# Patient Record
Sex: MALE | Race: BLACK OR AFRICAN AMERICAN | Employment: FULL TIME | URBAN - METROPOLITAN AREA
[De-identification: names, ages, dates, MRNs, and addresses within clinical notes are randomized per-mention and may not be internally consistent; named-entity substitution may affect disease eponyms.]

---

## 2017-08-27 ENCOUNTER — HOSPITAL ENCOUNTER (EMERGENCY)
Facility: HOSPITAL | Age: 32
Discharge: HOME | End: 2017-08-27
Attending: EMERGENCY MEDICINE
Payer: OTHER MISCELLANEOUS

## 2017-08-27 VITALS
HEIGHT: 76 IN | HEART RATE: 83 BPM | SYSTOLIC BLOOD PRESSURE: 124 MMHG | OXYGEN SATURATION: 99 % | TEMPERATURE: 98 F | DIASTOLIC BLOOD PRESSURE: 72 MMHG | BODY MASS INDEX: 38.36 KG/M2 | WEIGHT: 315 LBS | RESPIRATION RATE: 17 BRPM

## 2017-08-27 DIAGNOSIS — S61.210A LACERATION OF RIGHT INDEX FINGER WITHOUT FOREIGN BODY WITHOUT DAMAGE TO NAIL, INITIAL ENCOUNTER: Primary | ICD-10-CM

## 2017-08-27 PROCEDURE — 99283 EMERGENCY DEPT VISIT LOW MDM: CPT

## 2017-08-27 PROCEDURE — 12001 RPR S/N/AX/GEN/TRNK 2.5CM/<: CPT

## 2017-08-27 PROCEDURE — 90471 IMMUNIZATION ADMIN: CPT

## 2017-08-27 NOTE — ED PROVIDER NOTES
Patient Seen in: [facility] Emergency Department    History   Patient presents with:  Laceration Abrasion (integumentary)    Stated Complaint: Finger laceration    HPI    40-year-old male injured his right index finger on a sharp piece of metal at display    ============================================================  ED Course  ------------------------------------------------------------  MDM     Laceration repair:    Verbal consent was obtained from the patient.   The 2 cm laceration located right

## 2017-08-31 ENCOUNTER — HOSPITAL ENCOUNTER (EMERGENCY)
Facility: HOSPITAL | Age: 32
Discharge: HOME | End: 2017-08-31
Attending: PHYSICIAN ASSISTANT
Payer: OTHER MISCELLANEOUS

## 2017-08-31 VITALS
WEIGHT: 315 LBS | HEIGHT: 76 IN | HEART RATE: 80 BPM | OXYGEN SATURATION: 97 % | BODY MASS INDEX: 38.36 KG/M2 | TEMPERATURE: 97 F | RESPIRATION RATE: 18 BRPM | SYSTOLIC BLOOD PRESSURE: 137 MMHG | DIASTOLIC BLOOD PRESSURE: 78 MMHG

## 2017-08-31 DIAGNOSIS — S61.219D FINGER LACERATION, SUBSEQUENT ENCOUNTER: Primary | ICD-10-CM

## 2017-08-31 DIAGNOSIS — Z51.89 ENCOUNTER FOR POST-TRAUMATIC WOUND CHECK: ICD-10-CM

## 2017-08-31 PROCEDURE — 99281 EMR DPT VST MAYX REQ PHY/QHP: CPT

## 2017-08-31 NOTE — ED INITIAL ASSESSMENT (HPI)
Pt here for wound check to right index finger suture put in Sunday here and now noticed pus like drainage today

## 2017-09-01 NOTE — ED PROVIDER NOTES
Patient Seen in: [facility] AND [facility] Emergency Department    History   Patient presents with:  Laceration Abrasion (integumentary)    Stated Complaint: right index finger injury     HPI    HPI:     28year old male who presents who presents to the emergenc well-developed and well-nourished. No acute distress. Head: Normocephalic/atraumatic. Neck: The neck is supple. There is no evidence of JVD. No meningeal signs. Chest: There is no tenderness to the chest wall.   Respiratory: Respiratory effort was nor [phone]  Schedule an appointment as soon as possible for a visit in 2 days  For follow-up, For wound re-check    Orval Six, MD Jose Alejandro Matthews [address] [phone]    Schedule an appointment as so

## 2017-09-01 NOTE — ED NOTES
Pt dcd to home aox4, ambulatory, discharge instruction given and voices understanding, denies any concern

## 2018-01-26 ENCOUNTER — HOSPITAL ENCOUNTER (EMERGENCY)
Facility: HOSPITAL | Age: 33
Discharge: HOME | End: 2018-01-26
Payer: MEDICAID

## 2018-01-26 ENCOUNTER — APPOINTMENT (OUTPATIENT)
Dept: GENERAL RADIOLOGY | Facility: HOSPITAL | Age: 33
End: 2018-01-26
Payer: MEDICAID

## 2018-01-26 VITALS
HEIGHT: 76 IN | WEIGHT: 315 LBS | TEMPERATURE: 98 F | RESPIRATION RATE: 16 BRPM | DIASTOLIC BLOOD PRESSURE: 93 MMHG | OXYGEN SATURATION: 97 % | SYSTOLIC BLOOD PRESSURE: 153 MMHG | HEART RATE: 86 BPM | BODY MASS INDEX: 38.36 KG/M2

## 2018-01-26 DIAGNOSIS — R09.1 PLEURISY: ICD-10-CM

## 2018-01-26 DIAGNOSIS — J06.9 UPPER RESPIRATORY TRACT INFECTION, UNSPECIFIED TYPE: Primary | ICD-10-CM

## 2018-01-26 PROCEDURE — 71046 X-RAY EXAM CHEST 2 VIEWS: CPT

## 2018-01-26 PROCEDURE — 99283 EMERGENCY DEPT VISIT LOW MDM: CPT

## 2018-01-26 RX ORDER — PREDNISONE 20 MG/1
60 TABLET ORAL DAILY
Qty: 12 TABLET | Refills: 0 | Status: SHIPPED | OUTPATIENT
Start: 2018-01-26 | End: 2018-01-30

## 2018-01-26 RX ORDER — ACETAMINOPHEN AND CODEINE PHOSPHATE 300; 30 MG/1; MG/1
1-2 TABLET ORAL EVERY 6 HOURS PRN
Qty: 20 TABLET | Refills: 0 | Status: SHIPPED | OUTPATIENT
Start: 2018-01-26 | End: 2018-02-02

## 2018-01-26 NOTE — ED PROVIDER NOTES
Patient Seen in: [facility] AND [facility] Emergency Department    History   CC: cough  HPI: Yassine Corderoamber 35year old male  who presents to the ER c/o cough, runny nose, congestion, fever for the past 2 weeks.   States in the last couple of days however he has b 41.39 kg/m²         General - Appears well, non-toxic and in NAD  Head - Appears symmetrical without deformity/swelling cranium, scalp, or facial bones  Eyes - PERRL, sclera not injected, no discharge noted, no periorbital edema  ENT - EAC bilaterally with soon as possible for a visit in 2 days        Medications Prescribed:  Current Discharge Medication List    START taking these medications    Acetaminophen-Codeine #3 300-30 MG Oral Tab  Take 1-2 tablets by mouth every 6 (six) hours as needed for Pain.   Qt

## 2020-08-22 ENCOUNTER — HOSPITAL ENCOUNTER (EMERGENCY)
Facility: HOSPITAL | Age: 35
Discharge: HOME | End: 2020-08-22
Payer: COMMERCIAL

## 2020-08-22 ENCOUNTER — APPOINTMENT (OUTPATIENT)
Dept: GENERAL RADIOLOGY | Facility: HOSPITAL | Age: 35
End: 2020-08-22
Attending: NURSE PRACTITIONER
Payer: COMMERCIAL

## 2020-08-22 VITALS
SYSTOLIC BLOOD PRESSURE: 168 MMHG | RESPIRATION RATE: 20 BRPM | HEART RATE: 75 BPM | BODY MASS INDEX: 55 KG/M2 | OXYGEN SATURATION: 99 % | WEIGHT: 315 LBS | TEMPERATURE: 98 F | DIASTOLIC BLOOD PRESSURE: 90 MMHG

## 2020-08-22 DIAGNOSIS — S39.012A STRAIN OF LUMBAR REGION, INITIAL ENCOUNTER: Primary | ICD-10-CM

## 2020-08-22 LAB
BACTERIA UR QL AUTO: NEGATIVE /HPF
BILIRUB UR QL: NEGATIVE
CLARITY UR: CLEAR
COLOR UR: YELLOW
GLUCOSE UR-MCNC: NEGATIVE MG/DL
HGB UR QL STRIP.AUTO: NEGATIVE
KETONES UR-MCNC: NEGATIVE MG/DL
LEUKOCYTE ESTERASE UR QL STRIP.AUTO: NEGATIVE
NITRITE UR QL STRIP.AUTO: NEGATIVE
PH UR: 6 [PH] (ref 5–8)
PROT UR-MCNC: NEGATIVE MG/DL
RBC #/AREA URNS AUTO: 0 /HPF
SP GR UR STRIP: 1.02 (ref 1–1.03)
UROBILINOGEN UR STRIP-ACNC: <2
WBC #/AREA URNS AUTO: 5 /HPF

## 2020-08-22 PROCEDURE — 72100 X-RAY EXAM L-S SPINE 2/3 VWS: CPT | Performed by: NURSE PRACTITIONER

## 2020-08-22 PROCEDURE — 96372 THER/PROPH/DIAG INJ SC/IM: CPT

## 2020-08-22 PROCEDURE — 81001 URINALYSIS AUTO W/SCOPE: CPT | Performed by: NURSE PRACTITIONER

## 2020-08-22 PROCEDURE — 99284 EMERGENCY DEPT VISIT MOD MDM: CPT

## 2020-08-22 RX ORDER — KETOROLAC TROMETHAMINE 30 MG/ML
30 INJECTION, SOLUTION INTRAMUSCULAR; INTRAVENOUS ONCE
Status: COMPLETED | OUTPATIENT
Start: 2020-08-22 | End: 2020-08-22

## 2020-08-22 RX ORDER — CYCLOBENZAPRINE HCL 10 MG
10 TABLET ORAL 3 TIMES DAILY PRN
Qty: 20 TABLET | Refills: 0 | Status: SHIPPED | OUTPATIENT
Start: 2020-08-22 | End: 2020-08-29

## 2020-08-22 NOTE — ED NOTES
Patient safe to DC home per MD. Leong Peer to dress self. DC teaching done, instructions reviewed with patient including when and how to follow up with healthcare providers and when to seek emergency care. The patient verbalizes understanding.   Patient ambulator

## 2020-08-22 NOTE — ED PROVIDER NOTES
Patient Seen in: [facility] AND [facility] Emergency Department      History   Patient presents with:  Back Pain    Stated Complaint: lower back pain    34yo/m with hx of obesity reports to the ED with R lower back pain.  Patient was seen last night at [facility] i Pulmonary:      Effort: Pulmonary effort is normal.      Breath sounds: Normal breath sounds. Abdominal:      General: Bowel sounds are normal.      Palpations: Abdomen is soft. Musculoskeletal: Normal range of motion.          General: Tenderness pre =====  CONCLUSION:      Mild degenerative disc and facet disease throughout the lumbar spine, most prominent at L5-S1.         Dictated by (CST): Juan Huffman MD on 8/22/2020 at 6:36 PM       Finalized by (CST): Juan Huffman MD on 8/22/2020 at 6:38 PM

## 2020-08-22 NOTE — ED INITIAL ASSESSMENT (HPI)
PT states seen previously by MD about work injury to his back from bending and lifting. Presents today with concerns of continued uncontrolled back pain.  Taking naproxen without enough relief

## 2021-02-06 ENCOUNTER — APPOINTMENT (OUTPATIENT)
Dept: GENERAL RADIOLOGY | Facility: HOSPITAL | Age: 36
End: 2021-02-06
Attending: EMERGENCY MEDICINE
Payer: OTHER MISCELLANEOUS

## 2021-02-06 ENCOUNTER — HOSPITAL ENCOUNTER (EMERGENCY)
Facility: HOSPITAL | Age: 36
Discharge: HOME | End: 2021-02-06
Attending: EMERGENCY MEDICINE
Payer: OTHER MISCELLANEOUS

## 2021-02-06 VITALS
RESPIRATION RATE: 18 BRPM | HEART RATE: 84 BPM | DIASTOLIC BLOOD PRESSURE: 101 MMHG | SYSTOLIC BLOOD PRESSURE: 152 MMHG | OXYGEN SATURATION: 98 % | TEMPERATURE: 99 F

## 2021-02-06 DIAGNOSIS — M25.521 RIGHT ELBOW PAIN: Primary | ICD-10-CM

## 2021-02-06 PROCEDURE — 99283 EMERGENCY DEPT VISIT LOW MDM: CPT

## 2021-02-06 PROCEDURE — 73080 X-RAY EXAM OF ELBOW: CPT | Performed by: EMERGENCY MEDICINE

## 2021-02-06 NOTE — ED PROVIDER NOTES
Patient Seen in: [facility] AND [facility] Emergency Department      History   Patient presents with:  Elbow Pain    Stated Complaint: R elbow pain     HPI/Subjective:   HPI    51-year-old male with no significant past medical history here after work injury [address] [facility] Device 02/06/21 1156 None (Room air)       Current:BP (!) 152/101   Pulse 84   Temp 98.5 °F (36.9 °C) (Oral)   Resp 18   SpO2 98%         Physical Exam  Vitals signs and nursing note reviewed. HENT:      Head: Normocephalic.       Mouth/Throat:      Mouth and imaging and found XR without acute fracture  - pain meds offered, pt declining          The patient was informed of their elevated blood pressure reading in the Emergency Department.   They were informed of the dangers of undiagnosed and untreated hyper

## 2021-02-06 NOTE — ED INITIAL ASSESSMENT (HPI)
Pt reports R elbow pain since fall on 2/2/2021, pt reports \" It feels like something is moving inside my elbow\"

## 2021-06-12 ENCOUNTER — APPOINTMENT (OUTPATIENT)
Dept: GENERAL RADIOLOGY | Facility: HOSPITAL | Age: 36
End: 2021-06-12
Payer: COMMERCIAL

## 2021-06-12 ENCOUNTER — HOSPITAL ENCOUNTER (EMERGENCY)
Facility: HOSPITAL | Age: 36
Discharge: HOME | End: 2021-06-12
Payer: COMMERCIAL

## 2021-06-12 VITALS
OXYGEN SATURATION: 97 % | BODY MASS INDEX: 38.36 KG/M2 | RESPIRATION RATE: 20 BRPM | HEIGHT: 76 IN | WEIGHT: 315 LBS | DIASTOLIC BLOOD PRESSURE: 113 MMHG | HEART RATE: 85 BPM | TEMPERATURE: 98 F | SYSTOLIC BLOOD PRESSURE: 168 MMHG

## 2021-06-12 DIAGNOSIS — I10 HYPERTENSION, UNSPECIFIED TYPE: ICD-10-CM

## 2021-06-12 DIAGNOSIS — J06.9 VIRAL URI WITH COUGH: Primary | ICD-10-CM

## 2021-06-12 PROCEDURE — 71045 X-RAY EXAM CHEST 1 VIEW: CPT

## 2021-06-12 PROCEDURE — 99283 EMERGENCY DEPT VISIT LOW MDM: CPT

## 2021-06-12 RX ORDER — IBUPROFEN 600 MG/1
600 TABLET ORAL EVERY 8 HOURS PRN
Qty: 30 TABLET | Refills: 0 | Status: SHIPPED | OUTPATIENT
Start: 2021-06-12 | End: 2021-06-19

## 2021-06-12 RX ORDER — GUAIFENESIN DEXTROMETHORPHAN HYDROBROMIDE ORAL SOLUTION 10; 100 MG/5ML; MG/5ML
5 SOLUTION ORAL EVERY 12 HOURS
Qty: 70 ML | Refills: 0 | Status: SHIPPED | OUTPATIENT
Start: 2021-06-12 | End: 2021-06-19

## 2021-06-13 NOTE — ED PROVIDER NOTES
Patient Seen in: [facility] AND [facility] Emergency Department      History   Patient presents with:  Cough/URI    Stated Complaint: runny nose,sore throat, cough    HPI/Subjective:   35yo/m with no chronic medical problems reports with cough, congestion, runny Abdominal:      General: Bowel sounds are normal.      Palpations: Abdomen is soft. Musculoskeletal:         General: No tenderness or deformity. Normal range of motion. Cervical back: Normal range of motion and neck supple.    Skin:     General: S [address]  [phone]    In 2 days            Medications Prescribed:  Current Discharge Medication List    START taking these medications    ibuprofen 600 MG Oral Tab  Take 1 tablet (600 mg total) by mouth every 8 (eight) hours as needed for Pain or Fever.

## 2021-06-13 NOTE — ED QUICK NOTES
Patient cleared for discharge by np. Belongings with patient. Patient discharge instructions reviewed with patient including when and how to follow up  with healthcare provider and when to seek medical treatment.

## 2021-07-11 ENCOUNTER — APPOINTMENT (OUTPATIENT)
Dept: GENERAL RADIOLOGY | Facility: HOSPITAL | Age: 36
End: 2021-07-11
Attending: EMERGENCY MEDICINE
Payer: COMMERCIAL

## 2021-07-11 ENCOUNTER — HOSPITAL ENCOUNTER (EMERGENCY)
Facility: HOSPITAL | Age: 36
Discharge: HOME | End: 2021-07-11
Attending: EMERGENCY MEDICINE
Payer: COMMERCIAL

## 2021-07-11 VITALS
OXYGEN SATURATION: 98 % | WEIGHT: 315 LBS | RESPIRATION RATE: 19 BRPM | DIASTOLIC BLOOD PRESSURE: 80 MMHG | HEART RATE: 92 BPM | SYSTOLIC BLOOD PRESSURE: 177 MMHG | HEIGHT: 77 IN | TEMPERATURE: 97 F | BODY MASS INDEX: 37.19 KG/M2

## 2021-07-11 DIAGNOSIS — M25.572 ACUTE LEFT ANKLE PAIN: Primary | ICD-10-CM

## 2021-07-11 PROCEDURE — 73610 X-RAY EXAM OF ANKLE: CPT | Performed by: EMERGENCY MEDICINE

## 2021-07-11 PROCEDURE — 99283 EMERGENCY DEPT VISIT LOW MDM: CPT

## 2021-07-11 PROCEDURE — 73560 X-RAY EXAM OF KNEE 1 OR 2: CPT | Performed by: EMERGENCY MEDICINE

## 2021-07-11 NOTE — ED PROVIDER NOTES
Patient Seen in: [facility] AND [facility] Emergency Department      History   Patient presents with:  Leg or Foot Injury    Stated Complaint: L ankle injury    HPI/Subjective:   HPI    39 yoM presents for left ankle pain.   2 weeks ago he was struck in the ankle General: There is no distension. Palpations: Abdomen is soft. Tenderness: There is no abdominal tenderness. Musculoskeletal:         General: Normal range of motion. Cervical back: Normal range of motion.       Comments: Full range of motio

## 2021-07-11 NOTE — ED INITIAL ASSESSMENT (HPI)
Pt reports left ankle pain. Per pt he injured it at work a few weeks ago but pain has not resolved. Also c/o atraumatic left knee pain. Cms intact. Ambulatory in triage.

## 2021-07-11 NOTE — ED NOTES
Received pt a/ox3, clear speech, nad, no resp distress, ambulatory with steady gait  Here with c/o L ankle and heel pain and swelling after hitting it at work 3 weeks ago. Mild swelling noted to L lateral ankle    Today with L atraumatic knee pain.      Cms

## 2021-08-13 ENCOUNTER — OFFICE VISIT (OUTPATIENT)
Dept: FAMILY MEDICINE CLINIC | Facility: CLINIC | Age: 36
End: 2021-08-13
Payer: COMMERCIAL

## 2021-08-13 VITALS
WEIGHT: 315 LBS | DIASTOLIC BLOOD PRESSURE: 83 MMHG | BODY MASS INDEX: 37.19 KG/M2 | SYSTOLIC BLOOD PRESSURE: 133 MMHG | HEIGHT: 77 IN | HEART RATE: 74 BPM

## 2021-08-13 DIAGNOSIS — M79.672 LEFT FOOT PAIN: Primary | ICD-10-CM

## 2021-08-13 PROCEDURE — 3008F BODY MASS INDEX DOCD: CPT | Performed by: FAMILY MEDICINE

## 2021-08-13 PROCEDURE — 3079F DIAST BP 80-89 MM HG: CPT | Performed by: FAMILY MEDICINE

## 2021-08-13 PROCEDURE — 99203 OFFICE O/P NEW LOW 30 MIN: CPT | Performed by: FAMILY MEDICINE

## 2021-08-13 PROCEDURE — 3075F SYST BP GE 130 - 139MM HG: CPT | Performed by: FAMILY MEDICINE

## 2021-08-13 RX ORDER — METHYLPREDNISOLONE 4 MG/1
TABLET ORAL
Qty: 1 EACH | Refills: 0 | Status: SHIPPED | OUTPATIENT
Start: 2021-08-13 | End: 2021-08-30

## 2021-08-13 NOTE — PROGRESS NOTES
HPI/Subjective:   Patient ID: Jeff Sutton is a 39year old male. HPI  Patient presents with:  Establish Care: new patient  Ankle Pain: per pt ankle pain for 2 months   Injured at work was struck back of foot against a steel plate.   Seen in ED and x ra

## 2021-08-30 ENCOUNTER — OFFICE VISIT (OUTPATIENT)
Dept: FAMILY MEDICINE CLINIC | Facility: CLINIC | Age: 36
End: 2021-08-30
Payer: COMMERCIAL

## 2021-08-30 ENCOUNTER — LAB ENCOUNTER (OUTPATIENT)
Dept: LAB | Age: 36
End: 2021-08-30
Attending: FAMILY MEDICINE
Payer: COMMERCIAL

## 2021-08-30 VITALS
SYSTOLIC BLOOD PRESSURE: 143 MMHG | HEART RATE: 87 BPM | RESPIRATION RATE: 16 BRPM | DIASTOLIC BLOOD PRESSURE: 89 MMHG | BODY MASS INDEX: 42.66 KG/M2 | HEIGHT: 72 IN | WEIGHT: 315 LBS

## 2021-08-30 DIAGNOSIS — E66.01 CLASS 3 SEVERE OBESITY DUE TO EXCESS CALORIES WITHOUT SERIOUS COMORBIDITY WITH BODY MASS INDEX (BMI) OF 60.0 TO 69.9 IN ADULT (HCC): ICD-10-CM

## 2021-08-30 DIAGNOSIS — Z00.00 ROUTINE GENERAL MEDICAL EXAMINATION AT A HEALTH CARE FACILITY: Primary | ICD-10-CM

## 2021-08-30 LAB
ALBUMIN SERPL-MCNC: 3.8 G/DL (ref 3.4–5)
ALBUMIN/GLOB SERPL: 0.8 {RATIO} (ref 1–2)
ALP LIVER SERPL-CCNC: 79 U/L
ALT SERPL-CCNC: 27 U/L
ANION GAP SERPL CALC-SCNC: 4 MMOL/L (ref 0–18)
AST SERPL-CCNC: 14 U/L (ref 15–37)
BASOPHILS # BLD AUTO: 0.03 X10(3) UL (ref 0–0.2)
BASOPHILS NFR BLD AUTO: 0.3 %
BILIRUB SERPL-MCNC: 0.3 MG/DL (ref 0.1–2)
BUN BLD-MCNC: 22 MG/DL (ref 7–18)
BUN/CREAT SERPL: 19.1 (ref 10–20)
CALCIUM BLD-MCNC: 9.7 MG/DL (ref 8.5–10.1)
CHLORIDE SERPL-SCNC: 104 MMOL/L (ref 98–112)
CHOLEST SMN-MCNC: 157 MG/DL (ref ?–200)
CO2 SERPL-SCNC: 29 MMOL/L (ref 21–32)
CREAT BLD-MCNC: 1.15 MG/DL
DEPRECATED RDW RBC AUTO: 45.1 FL (ref 35.1–46.3)
EOSINOPHIL # BLD AUTO: 0.11 X10(3) UL (ref 0–0.7)
EOSINOPHIL NFR BLD AUTO: 1.2 %
ERYTHROCYTE [DISTWIDTH] IN BLOOD BY AUTOMATED COUNT: 13.2 % (ref 11–15)
EST. AVERAGE GLUCOSE BLD GHB EST-MCNC: 128 MG/DL (ref 68–126)
GLOBULIN PLAS-MCNC: 4.8 G/DL (ref 2.8–4.4)
GLUCOSE BLD-MCNC: 99 MG/DL (ref 70–99)
HBA1C MFR BLD HPLC: 6.1 % (ref ?–5.7)
HCT VFR BLD AUTO: 43.9 %
HDLC SERPL-MCNC: 58 MG/DL (ref 40–59)
HGB BLD-MCNC: 14.2 G/DL
IMM GRANULOCYTES # BLD AUTO: 0.03 X10(3) UL (ref 0–1)
IMM GRANULOCYTES NFR BLD: 0.3 %
LDLC SERPL CALC-MCNC: 85 MG/DL (ref ?–100)
LYMPHOCYTES # BLD AUTO: 2.28 X10(3) UL (ref 1–4)
LYMPHOCYTES NFR BLD AUTO: 24.6 %
M PROTEIN MFR SERPL ELPH: 8.6 G/DL (ref 6.4–8.2)
MCH RBC QN AUTO: 30.3 PG (ref 26–34)
MCHC RBC AUTO-ENTMCNC: 32.3 G/DL (ref 31–37)
MCV RBC AUTO: 93.8 FL
MONOCYTES # BLD AUTO: 0.85 X10(3) UL (ref 0.1–1)
MONOCYTES NFR BLD AUTO: 9.2 %
NEUTROPHILS # BLD AUTO: 5.96 X10 (3) UL (ref 1.5–7.7)
NEUTROPHILS # BLD AUTO: 5.96 X10(3) UL (ref 1.5–7.7)
NEUTROPHILS NFR BLD AUTO: 64.4 %
NONHDLC SERPL-MCNC: 99 MG/DL (ref ?–130)
OSMOLALITY SERPL CALC.SUM OF ELEC: 287 MOSM/KG (ref 275–295)
PATIENT FASTING Y/N/NP: YES
PATIENT FASTING Y/N/NP: YES
PLATELET # BLD AUTO: 341 10(3)UL (ref 150–450)
POTASSIUM SERPL-SCNC: 4.3 MMOL/L (ref 3.5–5.1)
RBC # BLD AUTO: 4.68 X10(6)UL
SODIUM SERPL-SCNC: 137 MMOL/L (ref 136–145)
TRIGL SERPL-MCNC: 73 MG/DL (ref 30–149)
VLDLC SERPL CALC-MCNC: 12 MG/DL (ref 0–30)
WBC # BLD AUTO: 9.3 X10(3) UL (ref 4–11)

## 2021-08-30 PROCEDURE — 80053 COMPREHEN METABOLIC PANEL: CPT | Performed by: FAMILY MEDICINE

## 2021-08-30 PROCEDURE — 85025 COMPLETE CBC W/AUTO DIFF WBC: CPT | Performed by: FAMILY MEDICINE

## 2021-08-30 PROCEDURE — 3079F DIAST BP 80-89 MM HG: CPT | Performed by: FAMILY MEDICINE

## 2021-08-30 PROCEDURE — 36415 COLL VENOUS BLD VENIPUNCTURE: CPT | Performed by: FAMILY MEDICINE

## 2021-08-30 PROCEDURE — 83036 HEMOGLOBIN GLYCOSYLATED A1C: CPT | Performed by: FAMILY MEDICINE

## 2021-08-30 PROCEDURE — 99395 PREV VISIT EST AGE 18-39: CPT | Performed by: FAMILY MEDICINE

## 2021-08-30 PROCEDURE — 80061 LIPID PANEL: CPT | Performed by: FAMILY MEDICINE

## 2021-08-30 PROCEDURE — 3008F BODY MASS INDEX DOCD: CPT | Performed by: FAMILY MEDICINE

## 2021-08-30 PROCEDURE — 3077F SYST BP >= 140 MM HG: CPT | Performed by: FAMILY MEDICINE

## 2021-08-30 NOTE — PROGRESS NOTES
HPI/Subjective:   Patient ID: Florentino Man is a 39year old male. HPI  Patient presents with:  Physical    History/Other:   Review of Systems   Constitutional: Negative. HENT: Negative. Eyes: Negative. Respiratory: Negative.     Cardiovascular: A1C      Meds This Visit:  Requested Prescriptions      No prescriptions requested or ordered in this encounter       Imaging & Referrals:  None

## 2021-09-22 ENCOUNTER — TELEPHONE (OUTPATIENT)
Dept: FAMILY MEDICINE CLINIC | Facility: CLINIC | Age: 36
End: 2021-09-22

## 2021-09-22 NOTE — TELEPHONE ENCOUNTER
Patient states since last Wednesday, cough, loss of taste/smell, fatigue, fever (T Max 101.0), loss of appetite, body aches, chills. Patient not aware of being around anyone who was positive for Covid. Patient denies shortness of breath, chest pain.

## 2021-09-22 NOTE — TELEPHONE ENCOUNTER
Patient scheduled a MyChart appointment for 9/23 for the following symptoms. I believe I have covid. I’m really weak and fatigued.

## 2021-09-23 ENCOUNTER — TELEMEDICINE (OUTPATIENT)
Dept: FAMILY MEDICINE CLINIC | Facility: CLINIC | Age: 36
End: 2021-09-23
Payer: COMMERCIAL

## 2021-09-23 DIAGNOSIS — R50.9 FEVER, UNSPECIFIED FEVER CAUSE: ICD-10-CM

## 2021-09-23 DIAGNOSIS — R53.83 FATIGUE, UNSPECIFIED TYPE: Primary | ICD-10-CM

## 2021-09-23 DIAGNOSIS — U07.1 COVID-19: ICD-10-CM

## 2021-09-23 PROCEDURE — 99213 OFFICE O/P EST LOW 20 MIN: CPT | Performed by: FAMILY MEDICINE

## 2021-09-23 NOTE — PROGRESS NOTES
This is a telemedicine visit with live, interactive video and audio. Patient understands and accepts financial responsibility for any deductible, co-insurance and/or co-pays associated with this service.     SUBJECTIVE  Home tests positive for COVID 19

## 2021-09-30 ENCOUNTER — TELEPHONE (OUTPATIENT)
Dept: FAMILY MEDICINE CLINIC | Facility: CLINIC | Age: 36
End: 2021-09-30

## 2021-09-30 NOTE — TELEPHONE ENCOUNTER
I attempted to connect but unable to through [address] [place] [address] Rd. Please call and inquire what is needed. If straight forward we can provide the letter.

## 2021-09-30 NOTE — TELEPHONE ENCOUNTER
Pt calling ,stated he was scheduled for Video visit at 12:30 but did not receive a call- need letter to return to school/work

## 2021-10-01 ENCOUNTER — TELEMEDICINE (OUTPATIENT)
Dept: FAMILY MEDICINE CLINIC | Facility: CLINIC | Age: 36
End: 2021-10-01

## 2021-10-01 DIAGNOSIS — U07.1 COVID-19: Primary | ICD-10-CM

## 2021-10-01 PROCEDURE — 99213 OFFICE O/P EST LOW 20 MIN: CPT | Performed by: FAMILY MEDICINE

## 2021-10-01 RX ORDER — ALBUTEROL SULFATE 90 UG/1
2 AEROSOL, METERED RESPIRATORY (INHALATION) EVERY 4 HOURS PRN
Qty: 1 EACH | Refills: 0 | Status: SHIPPED | OUTPATIENT
Start: 2021-10-01

## 2021-10-01 NOTE — PROGRESS NOTES
This is a telemedicine visit with live, interactive video and audio. Patient understands and accepts financial responsibility for any deductible, co-insurance and/or co-pays associated with this service.     SUBJECTIVE  Patient connects to discuss retur

## 2021-10-09 ENCOUNTER — TELEMEDICINE (OUTPATIENT)
Dept: FAMILY MEDICINE CLINIC | Facility: CLINIC | Age: 36
End: 2021-10-09

## 2021-10-09 DIAGNOSIS — U07.1 COVID-19: Primary | ICD-10-CM

## 2021-10-09 PROCEDURE — 99213 OFFICE O/P EST LOW 20 MIN: CPT | Performed by: FAMILY MEDICINE

## 2021-10-09 NOTE — PROGRESS NOTES
This is a telemedicine visit with live, interactive video and audio. Patient understands and accepts financial responsibility for any deductible, co-insurance and/or co-pays associated with this service.     SUBJECTIVE  Patient requests two letters to a

## 2022-02-22 ENCOUNTER — OFFICE VISIT (OUTPATIENT)
Dept: FAMILY MEDICINE CLINIC | Facility: CLINIC | Age: 37
End: 2022-02-22
Payer: COMMERCIAL

## 2022-02-22 VITALS
HEART RATE: 87 BPM | WEIGHT: 315 LBS | BODY MASS INDEX: 37.19 KG/M2 | SYSTOLIC BLOOD PRESSURE: 151 MMHG | HEIGHT: 77 IN | DIASTOLIC BLOOD PRESSURE: 88 MMHG

## 2022-02-22 DIAGNOSIS — M79.672 LEFT FOOT PAIN: Primary | ICD-10-CM

## 2022-02-22 PROCEDURE — 3008F BODY MASS INDEX DOCD: CPT | Performed by: FAMILY MEDICINE

## 2022-02-22 PROCEDURE — 3079F DIAST BP 80-89 MM HG: CPT | Performed by: FAMILY MEDICINE

## 2022-02-22 PROCEDURE — 99213 OFFICE O/P EST LOW 20 MIN: CPT | Performed by: FAMILY MEDICINE

## 2022-02-22 PROCEDURE — 3077F SYST BP >= 140 MM HG: CPT | Performed by: FAMILY MEDICINE

## 2022-02-22 RX ORDER — METHYLPREDNISOLONE 4 MG/1
TABLET ORAL
Qty: 1 EACH | Refills: 0 | Status: SHIPPED | OUTPATIENT
Start: 2022-02-22 | End: 2022-03-07 | Stop reason: ALTCHOICE

## 2022-03-03 ENCOUNTER — TELEPHONE (OUTPATIENT)
Dept: ADMINISTRATIVE | Age: 37
End: 2022-03-03

## 2022-03-07 ENCOUNTER — TELEPHONE (OUTPATIENT)
Dept: FAMILY MEDICINE CLINIC | Facility: CLINIC | Age: 37
End: 2022-03-07

## 2022-03-07 ENCOUNTER — OFFICE VISIT (OUTPATIENT)
Dept: FAMILY MEDICINE CLINIC | Facility: CLINIC | Age: 37
End: 2022-03-07
Payer: COMMERCIAL

## 2022-03-07 VITALS
BODY MASS INDEX: 37.19 KG/M2 | SYSTOLIC BLOOD PRESSURE: 140 MMHG | WEIGHT: 315 LBS | DIASTOLIC BLOOD PRESSURE: 84 MMHG | HEIGHT: 77 IN | HEART RATE: 82 BPM

## 2022-03-07 DIAGNOSIS — M79.674 PAIN OF TOE OF RIGHT FOOT: Primary | ICD-10-CM

## 2022-03-07 PROCEDURE — 3077F SYST BP >= 140 MM HG: CPT | Performed by: FAMILY MEDICINE

## 2022-03-07 PROCEDURE — 3008F BODY MASS INDEX DOCD: CPT | Performed by: FAMILY MEDICINE

## 2022-03-07 PROCEDURE — 3079F DIAST BP 80-89 MM HG: CPT | Performed by: FAMILY MEDICINE

## 2022-03-07 PROCEDURE — 99213 OFFICE O/P EST LOW 20 MIN: CPT | Performed by: FAMILY MEDICINE

## 2022-03-07 NOTE — PROGRESS NOTES
Subjective:   Patient ID: Unknown Rosangela is a 40year old male. HPI  Patient presents with:  Pain: pt presents with RT 5th toe in pain   was being seen and treated for left heel pain and referred to podiatry and has not been seen yet. No injury to the right foot but severe pain lateral margin small toe. History/Other:   Review of Systems   Musculoskeletal: Positive for myalgias. Right small toe pain   Neurological: Negative for numbness. No current outpatient medications on file. Allergies:No Known Allergies    Objective:   Physical Exam  Vitals reviewed. Musculoskeletal:      Comments: Right small toe tenderness lateral border. Full ROM. No noticeable swelling. Assessment & Plan:   Pain of toe of right foot  (primary encounter diagnosis)    Recommend seeing podiatrist for this also. No orders of the defined types were placed in this encounter.       Meds This Visit:  Requested Prescriptions      No prescriptions requested or ordered in this encounter       Imaging & Referrals:  PODIATRY - INTERNAL

## 2022-03-07 NOTE — TELEPHONE ENCOUNTER
Pt called stating that he would like his return to work date change to 3-14-22. Instead of 3-9-22 Informed Dr. Rebecca Turpin and he agreed. Pt informed he could see letter thru mychart.

## 2022-03-11 ENCOUNTER — HOSPITAL ENCOUNTER (OUTPATIENT)
Dept: GENERAL RADIOLOGY | Facility: HOSPITAL | Age: 37
Discharge: HOME | End: 2022-03-11
Attending: PODIATRIST
Payer: COMMERCIAL

## 2022-03-11 ENCOUNTER — OFFICE VISIT (OUTPATIENT)
Dept: PODIATRY CLINIC | Facility: CLINIC | Age: 37
End: 2022-03-11
Payer: COMMERCIAL

## 2022-03-11 DIAGNOSIS — R52 PAIN: ICD-10-CM

## 2022-03-11 DIAGNOSIS — R52 PAIN: Primary | ICD-10-CM

## 2022-03-11 DIAGNOSIS — M77.9 TENDONITIS: ICD-10-CM

## 2022-03-11 PROCEDURE — 99203 OFFICE O/P NEW LOW 30 MIN: CPT | Performed by: PODIATRIST

## 2022-03-11 PROCEDURE — 73630 X-RAY EXAM OF FOOT: CPT | Performed by: PODIATRIST

## 2022-03-15 NOTE — PROGRESS NOTES
HPI:    Patient ID: Rj Schwarz is a 40year old male. This pleasant 42-year-old male presents to me as a new patient on consult from [address] [place] [address]. Patient states that he is here because of pain associated with the top of his right foot. Pain is been present for the last couple of weeks and at times it is quite significant. He describes it is intermittent and seemingly related to activities. He is not aware of any injury he is not aware of swelling or redness and really does not have any sense of numbness, tingling or burning. When it bothers him he he limits activity and removes his shoe. ROS:     I did review medical status, he is not presently taking medications and has no known allergies. No current outpatient medications on file. Allergies:No Known Allergies   PHYSICAL EXAM:     On physical exam when asked he points to the base of the fifth toe of the right foot. He describes it as an aching soreness. In reference to his work-related activities there are no apparent changes since this pain developed. He is doing the same work. Is not aware of any injury associated with this area of pain. When asked it is apparent that he has purchased a new pair of steel toed shoes and on further discussion it may be a source of irritation from this new shoe. Based on the area of pain I did refer this patient for x-ray and I found the x-ray to be normal.  Symptoms seem inflammatory and after further discussion and consideration I placed him on Aleve 2 tablets a.m. and p.m. with meals. I also instructed him to avoid that shoe that is been seemingly a source of irritation. I instructed him to ice twice every evening for 15 minutes. After complete resolution and discussion I will plan follow-up with this patient in 2 weeks to assess the benefits of treatment.   He is well-informed         ASSESSMENT/PLAN:   Pain  (primary encounter diagnosis)  Tendonitis    No orders of the defined types were placed in this encounter.       Meds This Visit:  Requested Prescriptions      No prescriptions requested or ordered in this encounter       Imaging & Referrals:  None       FELIX#[id number]

## 2022-03-16 NOTE — TELEPHONE ENCOUNTER
Called pt to obtain details on disability forms. Pt states he has already been approved and no longer needs forms completed.  No further action needed

## 2022-03-17 ENCOUNTER — TELEPHONE (OUTPATIENT)
Dept: FAMILY MEDICINE CLINIC | Facility: CLINIC | Age: 37
End: 2022-03-17

## 2022-03-17 NOTE — TELEPHONE ENCOUNTER
Spoke with patient ( verified)--did see Dr. Alivia Echavarria 3/11/2022, but right foot pain 8/10 persists. Patient requesting order for uric acid level to check for gout (his mom is a nurse and suggested this). Patient denies redness or swelling, \"but whether I'm sitting, standing or walking, the pain is the same. \"    Uric acid order for [facility] lab pended for approval per patient request

## 2022-03-23 LAB — URIC ACID: 7.7 MG/DL (ref 4–8)

## 2022-03-24 ENCOUNTER — TELEMEDICINE (OUTPATIENT)
Dept: FAMILY MEDICINE CLINIC | Facility: CLINIC | Age: 37
End: 2022-03-24

## 2022-03-24 DIAGNOSIS — M10.079 ACUTE IDIOPATHIC GOUT OF FOOT, UNSPECIFIED LATERALITY: Primary | ICD-10-CM

## 2022-03-24 PROCEDURE — 99213 OFFICE O/P EST LOW 20 MIN: CPT | Performed by: FAMILY MEDICINE

## 2022-03-24 RX ORDER — COLCHICINE 0.6 MG/1
TABLET ORAL
Qty: 33 TABLET | Refills: 0 | Status: SHIPPED | OUTPATIENT
Start: 2022-03-24

## 2022-04-05 ENCOUNTER — TELEMEDICINE (OUTPATIENT)
Dept: FAMILY MEDICINE CLINIC | Facility: CLINIC | Age: 37
End: 2022-04-05

## 2022-04-05 DIAGNOSIS — M10.079 ACUTE IDIOPATHIC GOUT OF FOOT, UNSPECIFIED LATERALITY: Primary | ICD-10-CM

## 2022-04-05 PROCEDURE — 99213 OFFICE O/P EST LOW 20 MIN: CPT | Performed by: FAMILY MEDICINE

## 2022-04-07 ENCOUNTER — TELEMEDICINE (OUTPATIENT)
Dept: FAMILY MEDICINE CLINIC | Facility: CLINIC | Age: 37
End: 2022-04-07
Payer: COMMERCIAL

## 2022-04-07 DIAGNOSIS — M79.674 PAIN OF TOE OF RIGHT FOOT: ICD-10-CM

## 2022-04-07 DIAGNOSIS — M10.079 ACUTE IDIOPATHIC GOUT OF FOOT, UNSPECIFIED LATERALITY: Primary | ICD-10-CM

## 2022-04-07 PROCEDURE — 99214 OFFICE O/P EST MOD 30 MIN: CPT | Performed by: FAMILY MEDICINE

## 2022-04-07 RX ORDER — INDOMETHACIN 50 MG/1
50 CAPSULE ORAL 2 TIMES DAILY WITH MEALS
Qty: 15 CAPSULE | Refills: 0 | Status: SHIPPED | OUTPATIENT
Start: 2022-04-07

## 2022-04-08 ENCOUNTER — TELEPHONE (OUTPATIENT)
Dept: FAMILY MEDICINE CLINIC | Facility: CLINIC | Age: 37
End: 2022-04-08

## 2022-04-08 NOTE — TELEPHONE ENCOUNTER
Patient calling, identified name and , needs a return to work letter     Has been off from  and will return on     States he has no restrictions   Letter pended for your review and approval     [facility]

## 2022-04-09 NOTE — TELEPHONE ENCOUNTER
Patient states he did not receive the letter to his MyChart and asking if we can resend. Resent letter.

## 2022-04-15 ENCOUNTER — TELEPHONE (OUTPATIENT)
Dept: FAMILY MEDICINE CLINIC | Facility: CLINIC | Age: 37
End: 2022-04-15

## 2022-04-28 LAB — URIC ACID: 7.4 MG/DL (ref 4–8)

## 2022-04-29 ENCOUNTER — TELEMEDICINE (OUTPATIENT)
Dept: FAMILY MEDICINE CLINIC | Facility: CLINIC | Age: 37
End: 2022-04-29

## 2022-04-29 DIAGNOSIS — M10.079 ACUTE IDIOPATHIC GOUT OF FOOT, UNSPECIFIED LATERALITY: Primary | ICD-10-CM

## 2022-04-29 DIAGNOSIS — M79.671 FOOT PAIN, RIGHT: ICD-10-CM

## 2022-04-29 PROCEDURE — 99213 OFFICE O/P EST LOW 20 MIN: CPT | Performed by: FAMILY MEDICINE

## 2022-04-29 RX ORDER — COLCHICINE 0.6 MG/1
TABLET ORAL
Qty: 30 TABLET | Refills: 5 | Status: SHIPPED | OUTPATIENT
Start: 2022-04-29

## 2022-04-29 NOTE — TELEPHONE ENCOUNTER
Dr. Andrés Wolff,     Please sign off on form: FMLA  -Highlight the patient and hit \"Chart\" button. -In Chart Review, w/in the Encounter tab - click 1 time on the Telephone call encounter for 4/15/22 Scroll down the telephone encounter.  -Click \"scan on\" blue Hyperlink under \"Media\" heading for Disab Dr Andrés Wolff 4/29/22  w/in the telephone enc.  -Click on Acknowledge button at the bottom right corner and left-click onto image, signature stamp appears and drag signature to Provider signature line. Stamp will turn blue. Close window.      Thank you,    Dennis Larios

## 2022-05-04 NOTE — TELEPHONE ENCOUNTER
Disab completed and faxed to [address] [place] [address] [phone].  Sent pt [employer] message

## 2022-06-16 ENCOUNTER — TELEMEDICINE (OUTPATIENT)
Dept: FAMILY MEDICINE CLINIC | Facility: CLINIC | Age: 37
End: 2022-06-16

## 2022-06-16 ENCOUNTER — TELEPHONE (OUTPATIENT)
Dept: FAMILY MEDICINE CLINIC | Facility: CLINIC | Age: 37
End: 2022-06-16

## 2022-06-16 DIAGNOSIS — M79.672 LEFT FOOT PAIN: ICD-10-CM

## 2022-06-16 DIAGNOSIS — M10.079 ACUTE IDIOPATHIC GOUT OF FOOT, UNSPECIFIED LATERALITY: Primary | ICD-10-CM

## 2022-06-16 DIAGNOSIS — M79.671 FOOT PAIN, RIGHT: ICD-10-CM

## 2022-06-16 PROCEDURE — 99213 OFFICE O/P EST LOW 20 MIN: CPT | Performed by: FAMILY MEDICINE

## 2022-06-16 RX ORDER — INDOMETHACIN 50 MG/1
50 CAPSULE ORAL 2 TIMES DAILY WITH MEALS
Qty: 14 CAPSULE | Refills: 0 | Status: SHIPPED | OUTPATIENT
Start: 2022-06-16

## 2022-06-16 NOTE — TELEPHONE ENCOUNTER
Dr. Cast Early: please review letter generated earlier today. Patient states date is incorrect. \" please begin restriction 5/2/22\" and date should be 6/22/22. Are you ok with the change? Pended letter with change if you approve. Also patient took covid at home test and positive. Yesterday started with symptoms, Sore throat, stuffy nose. Mild symptoms. Will generate covid letter for employer with CDC guidelines.

## 2022-08-08 ENCOUNTER — OFFICE VISIT (OUTPATIENT)
Dept: FAMILY MEDICINE CLINIC | Facility: CLINIC | Age: 37
End: 2022-08-08
Payer: COMMERCIAL

## 2022-08-08 VITALS
SYSTOLIC BLOOD PRESSURE: 139 MMHG | HEART RATE: 84 BPM | DIASTOLIC BLOOD PRESSURE: 89 MMHG | HEIGHT: 77 IN | BODY MASS INDEX: 37.19 KG/M2 | WEIGHT: 315 LBS

## 2022-08-08 DIAGNOSIS — M10.079 ACUTE IDIOPATHIC GOUT OF FOOT, UNSPECIFIED LATERALITY: ICD-10-CM

## 2022-08-08 DIAGNOSIS — Z00.00 ROUTINE GENERAL MEDICAL EXAMINATION AT A HEALTH CARE FACILITY: Primary | ICD-10-CM

## 2022-08-08 DIAGNOSIS — R73.03 PRE-DIABETES: ICD-10-CM

## 2022-08-08 PROBLEM — H18.603 KERATOCONUS OF BOTH EYES: Status: ACTIVE | Noted: 2022-08-08

## 2022-08-08 PROCEDURE — 3008F BODY MASS INDEX DOCD: CPT | Performed by: FAMILY MEDICINE

## 2022-08-08 PROCEDURE — 99395 PREV VISIT EST AGE 18-39: CPT | Performed by: FAMILY MEDICINE

## 2022-08-08 PROCEDURE — 3075F SYST BP GE 130 - 139MM HG: CPT | Performed by: FAMILY MEDICINE

## 2022-08-08 PROCEDURE — 3079F DIAST BP 80-89 MM HG: CPT | Performed by: FAMILY MEDICINE

## 2022-08-09 ENCOUNTER — TELEPHONE (OUTPATIENT)
Dept: FAMILY MEDICINE CLINIC | Facility: CLINIC | Age: 37
End: 2022-08-09

## 2022-08-09 NOTE — TELEPHONE ENCOUNTER
Pt called states he never received note for work requesting it be resent through [employer]. Resent work note via mychart from yesterday's office visit with Dr. Du Crenshaw.

## 2022-08-23 LAB
ABSOLUTE BASOPHILS: 18 CELLS/UL (ref 0–200)
ABSOLUTE EOSINOPHILS: 88 CELLS/UL (ref 15–500)
ABSOLUTE LYMPHOCYTES: 1778 CELLS/UL (ref 850–3900)
ABSOLUTE MONOCYTES: 704 CELLS/UL (ref 200–950)
ABSOLUTE NEUTROPHILS: 6213 CELLS/UL (ref 1500–7800)
ALBUMIN/GLOBULIN RATIO: 1.4 (CALC) (ref 1–2.5)
ALBUMIN: 4.3 G/DL (ref 3.6–5.1)
ALKALINE PHOSPHATASE: 66 U/L (ref 36–130)
ALT: 23 U/L (ref 9–46)
AST: 19 U/L (ref 10–40)
BASOPHILS: 0.2 %
BILIRUBIN, TOTAL: 0.7 MG/DL (ref 0.2–1.2)
BUN: 12 MG/DL (ref 7–25)
CALCIUM: 9.6 MG/DL (ref 8.6–10.3)
CARBON DIOXIDE: 28 MMOL/L (ref 20–32)
CHLORIDE: 104 MMOL/L (ref 98–110)
CHOL/HDLC RATIO: 2.6 (CALC)
CHOLESTEROL, TOTAL: 157 MG/DL
CREATININE: 1.14 MG/DL (ref 0.6–1.26)
EGFR: 85 ML/MIN/1.73M2
EOSINOPHILS: 1 %
GLOBULIN: 3.1 G/DL (CALC) (ref 1.9–3.7)
GLUCOSE: 101 MG/DL (ref 65–99)
HDL CHOLESTEROL: 60 MG/DL
HEMATOCRIT: 41.5 % (ref 38.5–50)
HEMOGLOBIN A1C: 5.6 % OF TOTAL HGB
HEMOGLOBIN: 14 G/DL (ref 13.2–17.1)
LDL-CHOLESTEROL: 81 MG/DL (CALC)
LYMPHOCYTES: 20.2 %
MCH: 30 PG (ref 27–33)
MCHC: 33.7 G/DL (ref 32–36)
MCV: 88.9 FL (ref 80–100)
MONOCYTES: 8 %
MPV: 10.7 FL (ref 7.5–12.5)
NEUTROPHILS: 70.6 %
NON-HDL CHOLESTEROL: 97 MG/DL (CALC)
PLATELET COUNT: 303 THOUSAND/UL (ref 140–400)
POTASSIUM: 4.4 MMOL/L (ref 3.5–5.3)
PROTEIN, TOTAL: 7.4 G/DL (ref 6.1–8.1)
RDW: 12.6 % (ref 11–15)
RED BLOOD CELL COUNT: 4.67 MILLION/UL (ref 4.2–5.8)
SODIUM: 139 MMOL/L (ref 135–146)
TRIGLYCERIDES: 80 MG/DL
URIC ACID: 7.6 MG/DL (ref 4–8)
WHITE BLOOD CELL COUNT: 8.8 THOUSAND/UL (ref 3.8–10.8)

## 2022-09-01 ENCOUNTER — TELEMEDICINE (OUTPATIENT)
Dept: FAMILY MEDICINE CLINIC | Facility: CLINIC | Age: 37
End: 2022-09-01

## 2022-09-01 DIAGNOSIS — M10.079 ACUTE IDIOPATHIC GOUT OF FOOT, UNSPECIFIED LATERALITY: Primary | ICD-10-CM

## 2022-09-01 DIAGNOSIS — E66.01 CLASS 3 SEVERE OBESITY DUE TO EXCESS CALORIES WITHOUT SERIOUS COMORBIDITY WITH BODY MASS INDEX (BMI) OF 60.0 TO 69.9 IN ADULT (HCC): ICD-10-CM

## 2022-09-01 PROCEDURE — 99214 OFFICE O/P EST MOD 30 MIN: CPT | Performed by: FAMILY MEDICINE

## 2022-09-12 ENCOUNTER — TELEPHONE (OUTPATIENT)
Dept: FAMILY MEDICINE CLINIC | Facility: CLINIC | Age: 37
End: 2022-09-12

## 2022-09-12 ENCOUNTER — MED REC SCAN ONLY (OUTPATIENT)
Dept: FAMILY MEDICINE CLINIC | Facility: CLINIC | Age: 37
End: 2022-09-12

## 2022-09-12 NOTE — TELEPHONE ENCOUNTER
Received fax from [address] Forms emailed to [email or web address]; made copies placed in scanning. Originals placed in Emily's inbox.

## 2022-09-14 NOTE — TELEPHONE ENCOUNTER
Dr. Inga Albarran,     Please sign off on form: Disab  -Highlight the patient and hit \"Chart\" button. -In Chart Review, w/in the Encounter tab - click 1 time on the Telephone call encounter for 9/12/22 Scroll down the telephone encounter.  -Click \"scan on\" blue Hyperlink under \"Media\" heading for Disab Dr Inag Albarran 9/13/22  w/in the telephone enc.  -Click on Acknowledge button at the bottom right corner and left-click onto image, signature stamp appears and drag signature to Provider signature line. Stamp will turn blue. Close window.      Thank you,    Winter Wang

## 2022-09-15 NOTE — TELEPHONE ENCOUNTER
Disab completed and faxed to [address] [place] [address] [phone].  Sent pt [email or web address] message

## 2022-11-21 VITALS
HEART RATE: 96 BPM | SYSTOLIC BLOOD PRESSURE: 181 MMHG | DIASTOLIC BLOOD PRESSURE: 88 MMHG | RESPIRATION RATE: 20 BRPM | TEMPERATURE: 98 F | OXYGEN SATURATION: 96 %

## 2022-11-21 PROCEDURE — 99283 EMERGENCY DEPT VISIT LOW MDM: CPT

## 2022-11-22 ENCOUNTER — HOSPITAL ENCOUNTER (EMERGENCY)
Facility: HOSPITAL | Age: 37
Discharge: HOME | End: 2022-11-22
Attending: EMERGENCY MEDICINE
Payer: COMMERCIAL

## 2022-11-22 DIAGNOSIS — B34.9 VIRAL SYNDROME: Primary | ICD-10-CM

## 2022-11-22 LAB
FLUAV + FLUBV RNA SPEC NAA+PROBE: NEGATIVE
FLUAV + FLUBV RNA SPEC NAA+PROBE: NEGATIVE
RSV RNA SPEC NAA+PROBE: NEGATIVE
SARS-COV-2 RNA RESP QL NAA+PROBE: NOT DETECTED

## 2022-11-22 PROCEDURE — 0241U SARS-COV-2/FLU A AND B/RSV BY PCR (GENEXPERT): CPT | Performed by: EMERGENCY MEDICINE

## 2022-11-22 RX ORDER — IBUPROFEN 600 MG/1
600 TABLET ORAL ONCE
Status: COMPLETED | OUTPATIENT
Start: 2022-11-22 | End: 2022-11-22

## 2022-11-22 NOTE — ED QUICK NOTES
Pt discharged in stable condition. Discharge instructions and follow up care reviewed with pt bedside. Pt denies any questions and/or concerns.   Steady gait

## 2022-11-22 NOTE — DISCHARGE INSTRUCTIONS
\"You had elevated blood pressure today and you need to follow up with your doctor for a repeat blood pressure check and further discussion of lifestyle modifications that include Weight Reduction - Dietary Sodium Restriction - Increased Physical Activity and Moderation in alcohol (ETOH) Consumption. If possible check your pressure at home and keep a blood pressure log to bring to your physician. \"

## 2022-12-14 ENCOUNTER — MED REC SCAN ONLY (OUTPATIENT)
Dept: FAMILY MEDICINE CLINIC | Facility: CLINIC | Age: 37
End: 2022-12-14

## 2022-12-26 ENCOUNTER — PATIENT MESSAGE (OUTPATIENT)
Dept: FAMILY MEDICINE CLINIC | Facility: CLINIC | Age: 37
End: 2022-12-26

## 2022-12-26 ENCOUNTER — TELEMEDICINE (OUTPATIENT)
Dept: FAMILY MEDICINE CLINIC | Facility: CLINIC | Age: 37
End: 2022-12-26

## 2022-12-26 DIAGNOSIS — M10.079 ACUTE IDIOPATHIC GOUT OF FOOT, UNSPECIFIED LATERALITY: Primary | ICD-10-CM

## 2023-03-13 ENCOUNTER — TELEMEDICINE (OUTPATIENT)
Dept: FAMILY MEDICINE CLINIC | Facility: CLINIC | Age: 38
End: 2023-03-13

## 2023-03-13 DIAGNOSIS — M79.671 FOOT PAIN, RIGHT: Primary | ICD-10-CM

## 2023-03-13 DIAGNOSIS — M10.079 ACUTE IDIOPATHIC GOUT OF FOOT, UNSPECIFIED LATERALITY: ICD-10-CM

## 2023-03-13 PROCEDURE — 99213 OFFICE O/P EST LOW 20 MIN: CPT | Performed by: FAMILY MEDICINE

## 2023-03-13 RX ORDER — NAPROXEN 500 MG/1
500 TABLET ORAL 2 TIMES DAILY
Qty: 60 TABLET | Refills: 1 | Status: SHIPPED | OUTPATIENT
Start: 2023-03-13 | End: 2024-03-07

## 2023-03-16 ENCOUNTER — TELEPHONE (OUTPATIENT)
Dept: FAMILY MEDICINE CLINIC | Facility: CLINIC | Age: 38
End: 2023-03-16

## 2023-04-05 ENCOUNTER — HOSPITAL ENCOUNTER (OUTPATIENT)
Dept: GENERAL RADIOLOGY | Age: 38
Discharge: HOME | End: 2023-04-05
Attending: PODIATRIST
Payer: COMMERCIAL

## 2023-04-05 ENCOUNTER — OFFICE VISIT (OUTPATIENT)
Dept: PODIATRY CLINIC | Facility: CLINIC | Age: 38
End: 2023-04-05

## 2023-04-05 DIAGNOSIS — G89.29 CHRONIC HEEL PAIN, LEFT: ICD-10-CM

## 2023-04-05 DIAGNOSIS — M79.672 CHRONIC HEEL PAIN, LEFT: ICD-10-CM

## 2023-04-05 DIAGNOSIS — M21.6X2 ACQUIRED EQUINUS DEFORMITY OF LEFT FOOT: ICD-10-CM

## 2023-04-05 DIAGNOSIS — M76.62 TENDONITIS, ACHILLES, LEFT: ICD-10-CM

## 2023-04-05 DIAGNOSIS — M76.62 TENDONITIS, ACHILLES, LEFT: Primary | ICD-10-CM

## 2023-04-05 PROCEDURE — 99213 OFFICE O/P EST LOW 20 MIN: CPT | Performed by: PODIATRIST

## 2023-04-05 PROCEDURE — 73650 X-RAY EXAM OF HEEL: CPT | Performed by: PODIATRIST

## 2023-06-15 ENCOUNTER — TELEMEDICINE (OUTPATIENT)
Dept: FAMILY MEDICINE CLINIC | Facility: CLINIC | Age: 38
End: 2023-06-15

## 2023-06-15 DIAGNOSIS — M10.079 ACUTE IDIOPATHIC GOUT OF FOOT, UNSPECIFIED LATERALITY: ICD-10-CM

## 2023-06-15 DIAGNOSIS — M79.671 FOOT PAIN, RIGHT: Primary | ICD-10-CM

## 2023-06-15 PROCEDURE — 99214 OFFICE O/P EST MOD 30 MIN: CPT | Performed by: FAMILY MEDICINE

## 2023-11-25 ENCOUNTER — TELEPHONE (OUTPATIENT)
Dept: FAMILY MEDICINE CLINIC | Facility: CLINIC | Age: 38
End: 2023-11-25

## 2023-11-26 NOTE — TELEPHONE ENCOUNTER
Failed Protocol/No Protocol. Requested Prescriptions   Pending Prescriptions Disp Refills    colchicine 0.6 MG Oral Tab 30 tablet 5     Sig: Take take 2 tabs now then 1 tab one hour later. Following day start taking 1 tablet daily.        There is no refill protocol information for this order            Recent Outpatient Visits              5 months ago Foot pain, right    [facility], [address] [place] [address]    Telemedicine    7 months ago Tendonitis, Achilles, left    [facility], [address], [place], [place], [place]    Office Visit    8 months ago Foot pain, right    [facility], [address] [place] [address]    Telemedicine    11 months ago Acute idiopathic gout of foot, unspecified laterality    [facility], [address] [place] [address]    Telemedicine    1 year ago Acute idiopathic gout of foot, unspecified laterality    [facility], [address] [place] [address]    Telemedicine

## 2023-11-26 NOTE — TELEPHONE ENCOUNTER
Please review. Protocol Failed or has No Protocol. Requested Prescriptions   Pending Prescriptions Disp Refills    COLCHICINE 0.6 MG Oral Tab [Pharmacy Med Name: COLCHICINE 0.6MG TABLETS] 30 tablet 5     Sig: TAKE 2 TABLETS BY MOUTH NOW, THEN 1 TABLET IN 1 HOUR.  FOLLOWING DAY START 1 TABLET BY MOUTH DAILY       There is no refill protocol information for this order              Recent Outpatient Visits              5 months ago Foot pain, right    [facility], [address] [place] [address]    Telemedicine    7 months ago Tendonitis, Achilles, left    [facility], [address], [place], [place], [place]    Office Visit    8 months ago Foot pain, right    [facility], [address] [place] [address]    Telemedicine    11 months ago Acute idiopathic gout of foot, unspecified laterality    [facility], [address] [place] [address]    Telemedicine    1 year ago Acute idiopathic gout of foot, unspecified laterality    [facility], [address] [place] [address]    Telemedicine

## 2023-11-28 RX ORDER — COLCHICINE 0.6 MG/1
TABLET ORAL
Qty: 90 TABLET | Refills: 0 | Status: SHIPPED | OUTPATIENT
Start: 2023-11-28

## 2023-11-28 RX ORDER — COLCHICINE 0.6 MG/1
TABLET ORAL
Qty: 30 TABLET | Refills: 5 | Status: SHIPPED | OUTPATIENT
Start: 2023-11-28

## 2023-11-30 RX ORDER — ALLOPURINOL 300 MG/1
300 TABLET ORAL DAILY
Qty: 90 TABLET | Refills: 1 | Status: SHIPPED | OUTPATIENT
Start: 2023-11-30

## 2023-11-30 NOTE — TELEPHONE ENCOUNTER
PA Required      colchicine 0.6 MG Oral Tab, TAKE 2 TABLETS BY MOUTH NOW, THEN 1 TABLET IN 1 HOUR.  FOLLOWING DAY START 1 TABLET BY MOUTH DAILY, Disp: 90 tablet, Rfl: 0

## 2023-11-30 NOTE — TELEPHONE ENCOUNTER
Contact the payer to obtain prior authorization.       Payer: [employer]     Called pharmacy: Rx Id# [id number]     [facility]#[phone] - [facility]    Dx:M10.079 acute idiopathic gout of foot

## 2024-07-02 ENCOUNTER — E-VISIT (OUTPATIENT)
Dept: TELEHEALTH | Age: 39
End: 2024-07-02

## 2024-07-02 DIAGNOSIS — J02.9 PHARYNGITIS, UNSPECIFIED ETIOLOGY: Primary | ICD-10-CM

## 2024-07-02 RX ORDER — AMOXICILLIN 500 MG/1
500 CAPSULE ORAL 2 TIMES DAILY
Qty: 20 CAPSULE | Refills: 0 | Status: SHIPPED | OUTPATIENT
Start: 2024-07-02 | End: 2024-07-12

## 2024-07-02 NOTE — PROGRESS NOTES
Emanuel Christina is a 39 year old male.  HPI:   See answers to questions above.     Current Outpatient Medications   Medication Sig Dispense Refill    amoxicillin 500 MG Oral Cap Take 1 capsule (500 mg total) by mouth 2 (two) times daily for 10 days. 20 capsule 0    allopurinol 300 MG Oral Tab Take 1 tablet (300 mg total) by mouth daily. 90 tablet 1    colchicine 0.6 MG Oral Tab Take take 2 tabs now then 1 tab one hour later. Following day start taking 1 tablet daily. 30 tablet 5    colchicine 0.6 MG Oral Tab TAKE 2 TABLETS BY MOUTH NOW, THEN 1 TABLET IN 1 HOUR. FOLLOWING DAY START 1 TABLET BY MOUTH DAILY 90 tablet 0      History reviewed. No pertinent past medical history.   Past Surgical History:   Procedure Laterality Date    Other surgical history  06/2022    RT eye surgery       History reviewed. No pertinent family history.   Social History:  Social History     Socioeconomic History    Marital status: Single   Tobacco Use    Smoking status: Never    Smokeless tobacco: Never   Vaping Use    Vaping status: Never Used   Substance and Sexual Activity    Alcohol use: Yes     Comment: social    Drug use: Not Currently         ASSESSMENT AND PLAN:     Encounter Diagnosis   Name Primary?    Pharyngitis, unspecified etiology Yes           Meds & Refills for this Visit:  Requested Prescriptions     Signed Prescriptions Disp Refills    amoxicillin 500 MG Oral Cap 20 capsule 0     Sig: Take 1 capsule (500 mg total) by mouth 2 (two) times daily for 10 days.     Risks, benefits, and side effects of medication provided to patient.     Duration of  the service:  11 minutes    Patient advised to follow up with PCP if no improvement or worsening of symptoms  Refer to [employer] message for specific patient instructions    Emanuel Christina understands evisit evaluation is not a substitute for face-to-face examination or emergency care. Patient advised to go to ER or call 911 for worsening symptoms or acute distress.

## 2024-07-09 ENCOUNTER — TELEMEDICINE (OUTPATIENT)
Dept: FAMILY MEDICINE CLINIC | Facility: CLINIC | Age: 39
End: 2024-07-09
Payer: MEDICAID

## 2024-07-09 DIAGNOSIS — J02.0 ACUTE STREPTOCOCCAL PHARYNGITIS: Primary | ICD-10-CM

## 2024-07-09 DIAGNOSIS — M25.562 ACUTE PAIN OF LEFT KNEE: ICD-10-CM

## 2024-07-09 PROCEDURE — 99214 OFFICE O/P EST MOD 30 MIN: CPT | Performed by: FAMILY MEDICINE

## 2024-07-09 NOTE — PROGRESS NOTES
This is a telemedicine visit with live, interactive video and audio.     Patient understands and accepts financial responsibility for any deductible, co-insurance and/or co-pays associated with this service.    SUBJECTIVE  Patient had treatment for strep throat.  Feels beetter and needs return to work.  Left knee pain and stiffness. Swelling.  No injury.      HISTORY:  No past medical history on file.   Past Surgical History:   Procedure Laterality Date    Other surgical history  06/2022    RT eye surgery       No family history on file.   Social History     Socioeconomic History    Marital status: Single   Tobacco Use    Smoking status: Never    Smokeless tobacco: Never   Vaping Use    Vaping status: Never Used   Substance and Sexual Activity    Alcohol use: Yes     Comment: social    Drug use: Not Currently        No Known Allergies   Current Outpatient Medications   Medication Sig Dispense Refill    amoxicillin 500 MG Oral Cap Take 1 capsule (500 mg total) by mouth 2 (two) times daily for 10 days. 20 capsule 0    allopurinol 300 MG Oral Tab Take 1 tablet (300 mg total) by mouth daily. 90 tablet 1    colchicine 0.6 MG Oral Tab Take take 2 tabs now then 1 tab one hour later. Following day start taking 1 tablet daily. 30 tablet 5    colchicine 0.6 MG Oral Tab TAKE 2 TABLETS BY MOUTH NOW, THEN 1 TABLET IN 1 HOUR. FOLLOWING DAY START 1 TABLET BY MOUTH DAILY 90 tablet 0       OBJECTIVE  Physical Exam:   alert, appears stated age, and cooperative, Speaking in full sentences comfortably, and left knee no swelling.      I spent a total of 10 minutes on the video visit.     ASSESSMENT & PLAN  Diagnoses and all orders for this visit:    Acute streptococcal pharyngitis    Left knee pain    May return to work. Letter created.   Left knee XR and follow up in the office. May use ibuprofen for discomfort for now.       Rob Tran, DO

## 2024-08-05 ENCOUNTER — HOSPITAL ENCOUNTER (EMERGENCY)
Facility: HOSPITAL | Age: 39
Discharge: HOME | End: 2024-08-05
Attending: EMERGENCY MEDICINE
Payer: COMMERCIAL

## 2024-08-05 ENCOUNTER — APPOINTMENT (OUTPATIENT)
Dept: GENERAL RADIOLOGY | Facility: HOSPITAL | Age: 39
End: 2024-08-05
Payer: COMMERCIAL

## 2024-08-05 VITALS
TEMPERATURE: 99 F | HEART RATE: 79 BPM | OXYGEN SATURATION: 97 % | RESPIRATION RATE: 18 BRPM | DIASTOLIC BLOOD PRESSURE: 100 MMHG | SYSTOLIC BLOOD PRESSURE: 164 MMHG

## 2024-08-05 DIAGNOSIS — M17.12 ARTHRITIS OF LEFT KNEE: Primary | ICD-10-CM

## 2024-08-05 DIAGNOSIS — M25.462 EFFUSION OF LEFT KNEE: ICD-10-CM

## 2024-08-05 PROCEDURE — 99284 EMERGENCY DEPT VISIT MOD MDM: CPT

## 2024-08-05 PROCEDURE — 73560 X-RAY EXAM OF KNEE 1 OR 2: CPT | Performed by: EMERGENCY MEDICINE

## 2024-08-05 PROCEDURE — 99283 EMERGENCY DEPT VISIT LOW MDM: CPT

## 2024-08-05 RX ORDER — PREDNISONE 20 MG/1
40 TABLET ORAL DAILY
Qty: 10 TABLET | Refills: 0 | Status: SHIPPED | OUTPATIENT
Start: 2024-08-05 | End: 2024-08-10

## 2024-08-05 RX ORDER — NAPROXEN 500 MG/1
500 TABLET ORAL 2 TIMES DAILY PRN
Qty: 20 TABLET | Refills: 0 | Status: SHIPPED | OUTPATIENT
Start: 2024-08-05 | End: 2024-08-12

## 2024-08-06 NOTE — ED INITIAL ASSESSMENT (HPI)
Patient presents with left knee pain that has been hurting for about 2-3 weeks.     Pt pcp recommended xrays and if pain worse to come to ED     Pt ambulatory to triage..

## 2024-08-06 NOTE — ED PROVIDER NOTES
Patient Seen in: [facility] Emergency Department    History     Chief Complaint   Patient presents with    Knee Pain       HPI    39-year-old male with past medical history significant for obesity presents to the emergency department for evaluation left knee pain.  Patient states that he has been having pain for the past 2 to 3 weeks.  He states that if he tries to bend his knee too far, it causes more pain.  Pain is present at rest and worse when he is bearing weight.  He denies any specific injury that he can recall    History from Independent Source:       External Records Reviewed: Reviewed prior records available in EMR and patient has previously been treated for gout.    History reviewed. History reviewed. No pertinent past medical history.    History reviewed.   Past Surgical History:   Procedure Laterality Date    Other surgical history  06/2022    RT eye surgery          Medications :  (Not in a hospital admission)       History reviewed. No pertinent family history.    Smoking Status:   Social History     Socioeconomic History    Marital status: Single   Tobacco Use    Smoking status: Never    Smokeless tobacco: Never   Vaping Use    Vaping status: Never Used   Substance and Sexual Activity    Alcohol use: Yes     Comment: social    Drug use: Not Currently       Constitutional and vital signs reviewed.      Social History and Family History elements reviewed from today, pertinent positives to the presenting problem noted.    Physical Exam     ED Triage Vitals [08/05/24 2037]   BP (!) 164/100   Pulse 79   Resp 22   Temp 98.9 °F (37.2 °C)   Temp src Oral   SpO2 97 %   O2 Device None (Room air)       Physical Exam   Constitutional: AAOx3, well nourished, NAD  HEENT: Normocephalic, PERRLA, MMM  CV: s1s2+, RRR, no m/r/g, normal distal pulses  Pulmonary/Chest: CTA b/l with no rales, wheezes.  No chest wall tenderness  Abdominal: Nontender.  Nondistended. Soft. Bowel sounds are normal.   Neck/Back:   :    Musculoskeletal: Normal range of motion. No deformity.   Neurological: Awake, alert. Normal reflexes. No cranial nerve deficit.    Skin: Skin is warm and dry. No rash noted. No erythema.   Psychiatric:      All measures to prevent infection transmission during my interaction with the patient were taken. The patient was already wearing a droplet mask on my arrival to the room. Personal protective equipment was worn throughout the duration of the exam.      ED Course      Labs Reviewed - No data to display  My Independent Interpretation of EKG (if performed):     Imaging Results Available and Reviewed while in ED: XR KNEE (1 OR 2 VIEWS), LEFT (CPT=73560)    Result Date: 8/5/2024  CONCLUSION:   Mild medial compartment osteoarthritis without acute fracture or dislocation.     Dictated by (CST): Douglas Aguilera MD on 8/05/2024 at 9:34 PM     Finalized by (CST): Douglas Aguilera MD on 8/05/2024 at 9:35 PM         ED Medications Administered: Medications - No data to display          MDM     Vitals:    08/05/24 2037 08/05/24 2224   BP: (!) 164/100    Pulse: 79    Resp: 22 18   Temp: 98.9 °F (37.2 °C)    TempSrc: Oral    SpO2: 97%      *I personally reviewed and interpreted all ED vitals.    Independent Interpretation of Studies: I have independently reviewed patient's knee x-ray and patient has evidence of arthritis without acute fracture or dislocation    Social Determinants of Health:     Procedures:      Differential/MDM/Shared Decision Making: Differential Diagnosis includes meniscus injury, ligament strain, arthritis, gout, cellulitis, others.      The patient already has 50, gout, to contribute to the complexity of this ED evaluation.           Patient has evidence of arthritis on x-ray of the knee.  He also has a history of prior gouty arthritis.  Will place on naproxen and short course of prednisone in case there is gout causing symptoms.  Discussed case with patient that he will need to follow-up with his doctor or  orthopedicsto make sure that he has resolution of his symptoms.  Patient is comfortable with discharge plan.      Condition upon leaving the department: Stable    Disposition and Plan     Clinical Impression:  1. Arthritis of left knee    2. Effusion of left knee        Disposition:  Discharge    Follow-up:  Rob Tran DO  [address]  [place] [id number] [phone]    Follow up      Donald Linares MD  [address]  [address]  [facility] [id number] [phone]    Call in 1 day(s)        Medications Prescribed:  Current Discharge Medication List        START taking these medications    Details   !! naproxen 500 MG Oral Tab Take 1 tablet (500 mg total) by mouth 2 (two) times daily as needed.  Qty: 20 tablet, Refills: 0      !! naproxen 500 MG Oral Tab Take 1 tablet (500 mg total) by mouth 2 (two) times daily as needed.  Qty: 20 tablet, Refills: 0      predniSONE 20 MG Oral Tab Take 2 tablets (40 mg total) by mouth daily for 5 days.  Qty: 10 tablet, Refills: 0       !! - Potential duplicate medications found. Please discuss with provider.

## 2024-08-26 ENCOUNTER — E-VISIT (OUTPATIENT)
Dept: TELEHEALTH | Age: 39
End: 2024-08-26
Payer: COMMERCIAL

## 2024-08-26 DIAGNOSIS — R51.9 ACUTE INTRACTABLE HEADACHE, UNSPECIFIED HEADACHE TYPE: Primary | ICD-10-CM

## 2024-08-26 DIAGNOSIS — Z76.0 MEDICATION REFILL: Primary | ICD-10-CM

## 2024-08-27 NOTE — PROGRESS NOTES
HPI:  Emanuel Christina is a 39 year old male who presents for an evisit.  See [employer] communications above.    Current Outpatient Medications   Medication Sig Dispense Refill    allopurinol 300 MG Oral Tab Take 1 tablet (300 mg total) by mouth daily. 90 tablet 1    colchicine 0.6 MG Oral Tab Take take 2 tabs now then 1 tab one hour later. Following day start taking 1 tablet daily. 30 tablet 5    colchicine 0.6 MG Oral Tab TAKE 2 TABLETS BY MOUTH NOW, THEN 1 TABLET IN 1 HOUR. FOLLOWING DAY START 1 TABLET BY MOUTH DAILY 90 tablet 0     No past medical history on file.  Past Surgical History:   Procedure Laterality Date    Other surgical history  06/2022    RT eye surgery        Social History     Socioeconomic History    Marital status: Single   Tobacco Use    Smoking status: Never    Smokeless tobacco: Never   Vaping Use    Vaping status: Never Used   Substance and Sexual Activity    Alcohol use: Yes     Comment: social    Drug use: Not Currently          No results found for this or any previous visit (from the past 24 hour(s)).    ASSESSMENT AND PLAN:      ASSESSMENT:   Encounter Diagnosis   Name Primary?    Medication refill Yes       PLAN: Meds as below.  See patient Instructions  -Total of 5 minutes spent with patient.  Patient initiated visit for back pain, but actually looking to be seen for gout flare.  WIC/IC advised.     Meds & Refills for this Visit:  Requested Prescriptions      No prescriptions requested or ordered in this encounter       Risks, benefits, and side effects of medication explained and discussed.    There are no Patient Instructions on file for this visit.    The patient indicates understanding of these issues and agrees to the plan.  See attached patient references.  The patient is asked to return if sx's persist or worsen.    Emanuel Christina understands evisit evaluation is not a substitute for face-to-face examination or emergency care. Patient advised to go to ER or call 911 for worsening  symptoms or acute distress.

## 2024-08-27 NOTE — TELEPHONE ENCOUNTER
Please review. Protocol Failed; No Protocol    Requested Prescriptions   Pending Prescriptions Disp Refills    colchicine 0.6 MG Oral Tab 90 tablet 0     Sig: TAKE 2 TABLETS BY MOUTH NOW, THEN 1 TABLET IN 1 HOUR. FOLLOWING DAY START 1 TABLET BY MOUTH DAILY       There is no refill protocol information for this order              Recent Outpatient Visits              Yesterday Acute intractable headache, unspecified headache type    [facility], Virtual Visit [place] Mcmahan PA    E-Visit    Yesterday Medication refill    [facility], Virtual Visit [place] Mcmahan PA    E-Visit    1 month ago Acute streptococcal pharyngitis    [facility] [place]    [place]medicine    1 month ago Pharyngitis, unspecified etiology    [facility], Virtual Visit Favian Rodrigues Jr., APN    E-Visit    1 year ago Foot pain, right    [facility], Rob Castillo DO    Telemedicine

## 2024-08-27 NOTE — PROGRESS NOTES
HPI:  Emanuel Christina is a 39 year old male who presents for an evisit.  See [employer] communications above.    Current Outpatient Medications   Medication Sig Dispense Refill    allopurinol 300 MG Oral Tab Take 1 tablet (300 mg total) by mouth daily. 90 tablet 1    colchicine 0.6 MG Oral Tab Take take 2 tabs now then 1 tab one hour later. Following day start taking 1 tablet daily. 30 tablet 5    colchicine 0.6 MG Oral Tab TAKE 2 TABLETS BY MOUTH NOW, THEN 1 TABLET IN 1 HOUR. FOLLOWING DAY START 1 TABLET BY MOUTH DAILY 90 tablet 0     No past medical history on file.  Past Surgical History:   Procedure Laterality Date    Other surgical history  06/2022    RT eye surgery        Social History     Socioeconomic History    Marital status: Single   Tobacco Use    Smoking status: Never    Smokeless tobacco: Never   Vaping Use    Vaping status: Never Used   Substance and Sexual Activity    Alcohol use: Yes     Comment: social    Drug use: Not Currently          No results found for this or any previous visit (from the past 24 hour(s)).    ASSESSMENT AND PLAN:      ASSESSMENT:   Encounter Diagnosis   Name Primary?    Acute intractable headache, unspecified headache type Yes       PLAN: Meds as below.  See patient Instructions  -Total of 15 minutes spent with patient.  Patient with HA.  No relief with Excedrin migraine.  IC/ER advised.  Cannot evaluate further via an e-visit.     Meds & Refills for this Visit:  Requested Prescriptions      No prescriptions requested or ordered in this encounter       Risks, benefits, and side effects of medication explained and discussed.    There are no Patient Instructions on file for this visit.    The patient indicates understanding of these issues and agrees to the plan.  See attached patient references.  The patient is asked to return if sx's persist or worsen.    Emanuel Christina understands evisit evaluation is not a substitute for face-to-face examination or emergency care. Patient  advised to go to ER or call 911 for worsening symptoms or acute distress.

## 2024-08-28 RX ORDER — COLCHICINE 0.6 MG/1
TABLET ORAL
Qty: 30 TABLET | Refills: 2 | Status: SHIPPED | OUTPATIENT
Start: 2024-08-28

## 2024-09-03 ENCOUNTER — E-VISIT (OUTPATIENT)
Dept: TELEHEALTH | Age: 39
End: 2024-09-03
Payer: COMMERCIAL

## 2024-09-03 DIAGNOSIS — Z02.89 ENCOUNTER TO OBTAIN EXCUSE FROM WORK: ICD-10-CM

## 2024-09-03 DIAGNOSIS — K62.89 ANAL IRRITATION: ICD-10-CM

## 2024-09-03 DIAGNOSIS — R19.7 DIARRHEA IN ADULT PATIENT: Primary | ICD-10-CM

## 2024-09-03 PROCEDURE — 99423 OL DIG E/M SVC 21+ MIN: CPT | Performed by: PHYSICIAN ASSISTANT

## 2024-09-03 NOTE — PROGRESS NOTES
Eamnuel Christina is a 39 year old male who initiated e-visit care today.    HPI:   See answers to questionnaire submission     Current Outpatient Medications   Medication Sig Dispense Refill    colchicine 0.6 MG Oral Tab TAKE 2 TABLETS BY MOUTH NOW, THEN 1 TABLET IN 1 HOUR. FOLLOWING DAY START 1 TABLET BY MOUTH DAILY 30 tablet 2    allopurinol 300 MG Oral Tab Take 1 tablet (300 mg total) by mouth daily. 90 tablet 1      No past medical history on file.   Past Surgical History:   Procedure Laterality Date    Other surgical history  06/2022    RT eye surgery       No family history on file.   Social History:  Social History     Socioeconomic History    Marital status: Single   Tobacco Use    Smoking status: Never    Smokeless tobacco: Never   Vaping Use    Vaping status: Never Used   Substance and Sexual Activity    Alcohol use: Yes     Comment: social    Drug use: Not Currently         ASSESSMENT AND PLAN:       Diagnoses and all orders for this visit:    Diarrhea in adult patient    Anal irritation    Encounter to obtain excuse from work    If gout improved, stop colchicine. Push Fluids. Slowly advance diet. Aquaphor/Desitin for anal irritation. Work note for 48 hours provided.       Duration of  the service:  24 minutes      See [employer] message exchange and Patient Instructions for Comfort Care and patient education.

## 2024-09-30 RX ORDER — PREDNISONE 20 MG/1
TABLET ORAL
Refills: 0 | OUTPATIENT
Start: 2024-09-30

## 2025-03-18 ENCOUNTER — APPOINTMENT (OUTPATIENT)
Dept: GENERAL RADIOLOGY | Facility: HOSPITAL | Age: 40
End: 2025-03-18
Attending: EMERGENCY MEDICINE
Payer: COMMERCIAL

## 2025-03-18 ENCOUNTER — HOSPITAL ENCOUNTER (EMERGENCY)
Facility: HOSPITAL | Age: 40
Discharge: HOME | End: 2025-03-18
Attending: EMERGENCY MEDICINE
Payer: COMMERCIAL

## 2025-03-18 VITALS
SYSTOLIC BLOOD PRESSURE: 156 MMHG | TEMPERATURE: 99 F | DIASTOLIC BLOOD PRESSURE: 88 MMHG | OXYGEN SATURATION: 98 % | WEIGHT: 315 LBS | RESPIRATION RATE: 18 BRPM | BODY MASS INDEX: 52 KG/M2 | HEART RATE: 84 BPM

## 2025-03-18 DIAGNOSIS — S89.92XA INJURY OF LEFT KNEE, INITIAL ENCOUNTER: Primary | ICD-10-CM

## 2025-03-18 DIAGNOSIS — M71.22 BAKER CYST, LEFT: ICD-10-CM

## 2025-03-18 PROCEDURE — 99284 EMERGENCY DEPT VISIT MOD MDM: CPT

## 2025-03-18 PROCEDURE — 99283 EMERGENCY DEPT VISIT LOW MDM: CPT

## 2025-03-18 PROCEDURE — 73560 X-RAY EXAM OF KNEE 1 OR 2: CPT | Performed by: EMERGENCY MEDICINE

## 2025-03-18 RX ORDER — KETOROLAC TROMETHAMINE 10 MG/1
10 TABLET, FILM COATED ORAL EVERY 6 HOURS PRN
Qty: 20 TABLET | Refills: 0 | Status: SHIPPED | OUTPATIENT
Start: 2025-03-18 | End: 2025-03-23

## 2025-03-18 NOTE — ED INITIAL ASSESSMENT (HPI)
Left posterior knee and left neck pain secondary to mechanical slip and fall yesterday. Denies head injury or LOC. Is ambulatory

## 2025-03-18 NOTE — ED QUICK NOTES
Action and side effects of Toradol reviewed. Importance of MD follow up reviewed. Pt verbalized understanding.

## 2025-03-19 NOTE — ED PROVIDER NOTES
Patient Seen in: [facility] Emergency Department    History     Chief Complaint   Patient presents with    Knee Pain    Neck Pain    Fall       HPI    Patient presents to the ED complaining of left posterior knee pain after a slip and fall yesterday.  He denies direct injury to the knee.  Pain is isolated to the posterior knee and did not occur until today.  He states mild neck pain as well but denies other injury in the fall.  No head injury or other complaints.  Able to walk.    History reviewed. History reviewed. No pertinent past medical history.    History reviewed.   Past Surgical History:   Procedure Laterality Date    Other surgical history  06/2022    RT eye surgery          Medications :  Prescriptions Prior to Admission[1]     No family history on file.    Smoking Status:   Social History     Socioeconomic History    Marital status: Single   Tobacco Use    Smoking status: Never    Smokeless tobacco: Never   Vaping Use    Vaping status: Never Used   Substance and Sexual Activity    Alcohol use: Yes     Comment: social    Drug use: Not Currently       Constitutional and vital signs reviewed.      Social History and Family History elements reviewed from today, pertinent positives to the presenting problem noted.    Physical Exam     ED Triage Vitals [03/18/25 1156]   BP (!) 157/96   Pulse 85   Resp 18   Temp 98.5 °F (36.9 °C)   Temp src Temporal   SpO2 98 %   O2 Device None (Room air)       All measures to prevent infection transmission during my interaction with the patient were taken. Handwashing was performed prior to and after the exam.  Stethoscope and any equipment used during my examination was cleaned with super sani-cloth germicidal wipes following the exam.     Physical Exam  Vitals and nursing note reviewed.   Constitutional:       General: He is not in acute distress.     Appearance: He is well-developed. He is obese. He is not ill-appearing or toxic-appearing.   HENT:      Head:  Normocephalic and atraumatic.   Neck:      Trachea: No tracheal deviation.   Cardiovascular:      Rate and Rhythm: Normal rate.   Pulmonary:      Effort: Pulmonary effort is normal. No respiratory distress.   Musculoskeletal:         General: Tenderness present. No swelling or deformity.      Comments: Tenderness to the posterior of left knee, Baker's cyst present.   Skin:     General: Skin is warm and dry.   Neurological:      Mental Status: He is alert and oriented to person, place, and time.   Psychiatric:         Mood and Affect: Mood normal.         Behavior: Behavior normal.         ED Course      Labs Reviewed - No data to display    As Interpreted by me    Imaging Results Available and Reviewed while in ED: XR KNEE (1 OR 2 VIEWS), LEFT (CPT=73560)    Result Date: 3/18/2025  CONCLUSION:   No radiographically visible acute osseous injury of the left knee.    elm-remote  Dictated by (CST): Rahul Keller MD on 3/18/2025 at 1:00 PM     Finalized by (CST): Rahul Keller MD on 3/18/2025 at 1:01 PM         ED Medications Administered: Medications - No data to display      MDM     Vitals:    03/18/25 1156 03/18/25 1313 03/18/25 1359   BP: (!) 157/96  156/88   Pulse: 85  84   Resp: 18  18   Temp: 98.5 °F (36.9 °C)     TempSrc: Temporal     SpO2: 98% 98% 98%   Weight: (!) 197.3 kg       *I personally reviewed and interpreted all ED vitals.    Pulse Ox: 98%, Room air, Normal       Differential Diagnosis/ Diagnostic Considerations: Knee sprain, knee fracture, Baker's cyst, other    Complicating Factors: The patient already has does not have any pertinent problems on file. to contribute to the complexity of this ED evaluation.    Medical Decision Making  Patient presents to the ED with posterior left knee pain after slipping plastic.  X-ray negative for fracture.  Suspect knee sprain/effusion/Baker's cyst.  Stable for discharge with orthopedic follow-up and return precautions.    Problems Addressed:  Baker cyst, left:  acute illness or injury  Injury of left knee, initial encounter: acute illness or injury    Amount and/or Complexity of Data Reviewed  Radiology: ordered and independent interpretation performed. Decision-making details documented in ED Course.     Details: I personally reviewed the patient's left knee x-ray images and noted no fracture    Risk  Prescription drug management.        Condition upon leaving the department: Stable    Disposition and Plan     Clinical Impression:  1. Injury of left knee, initial encounter    2. Shoemaker cyst, left        Disposition:  Discharge    Follow-up:  Omar Hart PA-C  [address] [place] [id number]  [phone]    Schedule an appointment as soon as possible for a visit in 3 day(s)        Medications Prescribed:  Discharge Medication List as of 3/18/2025  1:54 PM        START taking these medications    Details   Ketorolac Tromethamine 10 MG Oral Tab Take 1 tablet (10 mg total) by mouth every 6 (six) hours as needed for Pain., Normal, Disp-20 tablet, R-0                              [1] (Not in a hospital admission)

## 2025-03-22 ENCOUNTER — TELEPHONE (OUTPATIENT)
Dept: FAMILY MEDICINE CLINIC | Facility: CLINIC | Age: 40
End: 2025-03-22

## 2025-03-22 NOTE — TELEPHONE ENCOUNTER
Received Short term disability form and request for records. Sent to forms office via email & interoffice. Copy sent for scanning.

## 2025-03-28 NOTE — TELEPHONE ENCOUNTER
Patient received GAMINSIDEt message, states he did not miss his appointment, he was waiting on Dr. Tran to connect, but never did.  Please advise if Patient can be rescheduled with you, ok to use [employer]24 or add on? Declined other providers.  Please also see message from RN below.

## 2025-03-28 NOTE — TELEPHONE ENCOUNTER
I received a call from patient and he stated that he was not able to connect with you yesterday he waited for 20 min and he  received the second link and he waited for 10 min and you never connected. The visit was due to [facility] paperwork he needed to have done.     Patient will be meeting with  his [facility]  today and they will ask patient if he is clear to go back to work or does he have a extension and when is the next appointment.  Patient wants to know when do you want to see him?     Also wanted to know if he can take the  Ketorolac Tromethamine 10 MG that was given to him in the ER. He wants to make sure it does not interfer with the colchicine 0.6 MG Oral Tab. Per patient he still has pain.     Patient also stated that if its ok to take the Colchicine he will need a refill. I have pended the medicationfor your review and approval. Thank you

## 2025-03-31 RX ORDER — COLCHICINE 0.6 MG/1
TABLET ORAL
Qty: 30 TABLET | Refills: 2 | Status: SHIPPED | OUTPATIENT
Start: 2025-03-31

## 2025-04-01 ENCOUNTER — OFFICE VISIT (OUTPATIENT)
Dept: FAMILY MEDICINE CLINIC | Facility: CLINIC | Age: 40
End: 2025-04-01

## 2025-04-01 VITALS
DIASTOLIC BLOOD PRESSURE: 88 MMHG | HEIGHT: 77 IN | HEART RATE: 77 BPM | SYSTOLIC BLOOD PRESSURE: 142 MMHG | TEMPERATURE: 98 F | WEIGHT: 315 LBS | BODY MASS INDEX: 37.19 KG/M2

## 2025-04-01 DIAGNOSIS — M25.562 ACUTE PAIN OF LEFT KNEE: Primary | ICD-10-CM

## 2025-04-01 PROCEDURE — 99214 OFFICE O/P EST MOD 30 MIN: CPT | Performed by: FAMILY MEDICINE

## 2025-04-01 PROCEDURE — 3077F SYST BP >= 140 MM HG: CPT | Performed by: FAMILY MEDICINE

## 2025-04-01 PROCEDURE — 3008F BODY MASS INDEX DOCD: CPT | Performed by: FAMILY MEDICINE

## 2025-04-01 PROCEDURE — 3079F DIAST BP 80-89 MM HG: CPT | Performed by: FAMILY MEDICINE

## 2025-04-01 RX ORDER — KETOROLAC TROMETHAMINE 10 MG/1
TABLET, FILM COATED ORAL
COMMUNITY
Start: 2025-03-31 | End: 2025-04-01

## 2025-04-01 RX ORDER — COLCHICINE 0.6 MG/1
TABLET ORAL
Qty: 30 TABLET | Refills: 2 | OUTPATIENT
Start: 2025-04-01

## 2025-04-01 RX ORDER — DICLOFENAC SODIUM 75 MG/1
75 TABLET, DELAYED RELEASE ORAL 2 TIMES DAILY
Qty: 60 TABLET | Refills: 1 | Status: SHIPPED | OUTPATIENT
Start: 2025-04-01

## 2025-04-01 NOTE — PROGRESS NOTES
Subjective:   Emanuel Christina is a 40 year old male who presents for ER F/U (Pain in left knee had a fall 3-18-25 in a restaurant still painful )   XR negative.  Placed on NSAID.  He walked into restaurant and slipped or tripped in doorway.  He states not very much improvement since injured.     History/Other:    Chief Complaint Reviewed and Verified  Nursing Notes Reviewed and   Verified  Medications Reviewed  Problem List Reviewed         Tobacco:  He has never smoked tobacco.    Current Outpatient Medications   Medication Sig Dispense Refill    colchicine 0.6 MG Oral Tab TAKE 2 TABLETS BY MOUTH NOW, THEN 1 TABLET IN 1 HOUR. FOLLOWING DAY START 1 TABLET BY MOUTH DAILY 30 tablet 2    Ketorolac Tromethamine 10 MG Oral Tab  (Patient not taking: Reported on 4/1/2025)      predniSONE 20 MG Oral Tab  (Patient not taking: Reported on 4/1/2025)      allopurinol 300 MG Oral Tab Take 1 tablet (300 mg total) by mouth daily. (Patient not taking: Reported on 4/1/2025) 90 tablet 1         Review of Systems:  Review of Systems   Constitutional: Negative.    Musculoskeletal:  Positive for arthralgias.        Left knee pain         Objective:   /88   Pulse 77   Temp 97.9 °F (36.6 °C) (Temporal)   Ht 6' 5\" (1.956 m)   Wt (!) 485 lb (220 kg)   BMI 57.51 kg/m²  Estimated body mass index is 57.51 kg/m² as calculated from the following:    Height as of this encounter: 6' 5\" (1.956 m).    Weight as of this encounter: 485 lb (220 kg).  Physical Exam  Vitals reviewed.   Musculoskeletal:      Right knee: Swelling present. Normal range of motion. Tenderness present over the MCL.      Instability Tests: Anterior drawer test negative. Posterior drawer test negative. Medial Justin test negative and lateral Justin test negative.           Assessment & Plan:   1. Acute pain of left knee (Primary)    Not very much improvement.  Has been off work due to pain.  He states can only return when 100 percent and no restrictions.   Standing and seated at work.  No heavy lifting. Will start PT and follow up in two weeks.        No follow-ups on file.    Rob Tran DO, 4/1/2025, 9:56 AM

## 2025-04-15 ENCOUNTER — OFFICE VISIT (OUTPATIENT)
Dept: FAMILY MEDICINE CLINIC | Facility: CLINIC | Age: 40
End: 2025-04-15

## 2025-04-15 VITALS
TEMPERATURE: 98 F | SYSTOLIC BLOOD PRESSURE: 146 MMHG | HEART RATE: 79 BPM | OXYGEN SATURATION: 96 % | DIASTOLIC BLOOD PRESSURE: 90 MMHG | WEIGHT: 315 LBS | BODY MASS INDEX: 37.19 KG/M2 | HEIGHT: 77 IN

## 2025-04-15 DIAGNOSIS — M25.562 ACUTE PAIN OF LEFT KNEE: Primary | ICD-10-CM

## 2025-04-15 DIAGNOSIS — R03.0 ELEVATED BLOOD PRESSURE READING: ICD-10-CM

## 2025-04-15 PROCEDURE — 3008F BODY MASS INDEX DOCD: CPT | Performed by: FAMILY MEDICINE

## 2025-04-15 PROCEDURE — 99213 OFFICE O/P EST LOW 20 MIN: CPT | Performed by: FAMILY MEDICINE

## 2025-04-15 PROCEDURE — 3080F DIAST BP >= 90 MM HG: CPT | Performed by: FAMILY MEDICINE

## 2025-04-15 PROCEDURE — 3077F SYST BP >= 140 MM HG: CPT | Performed by: FAMILY MEDICINE

## 2025-04-15 NOTE — PROGRESS NOTES
Subjective:   Emanuel Christina is a 40 year old male who presents for Follow - Up (Follow up on acute pain of the left knee ) Starting PT 4/28/2025.  Off work at this time.      History/Other:    Chief Complaint Reviewed and Verified  Nursing Notes Reviewed and   Verified  Allergies Reviewed  Medications Reviewed         Tobacco:  He has never smoked tobacco.    Current Medications[1]      Review of Systems:  Review of Systems   Constitutional: Negative.    Musculoskeletal:  Positive for arthralgias and joint swelling.        Left knee   Neurological:  Negative for numbness.         Objective:   BP (!) 165/93   Pulse 79   Temp 97.6 °F (36.4 °C) (Temporal)   Ht 6' 5\" (1.956 m)   Wt (!) 483 lb (219.1 kg)   SpO2 96%   BMI 57.28 kg/m²  Estimated body mass index is 57.28 kg/m² as calculated from the following:    Height as of this encounter: 6' 5\" (1.956 m).    Weight as of this encounter: 483 lb (219.1 kg).  Physical Exam  Vitals reviewed.   Musculoskeletal:      Left knee: No effusion or crepitus. Decreased range of motion. Tenderness present over the MCL. Normal patellar mobility.   Neurological:      Sensory: Sensation is intact.      Motor: Motor function is intact.      Deep Tendon Reflexes: Reflexes are normal and symmetric.           Assessment & Plan:   1. Acute pain of left knee (Primary)    Could not obtain an earlier PT appointment.  Off work at least until his therapy starts.  See back around the same time.  Will also reevaluate his elevated blood pressure at that time.       No follow-ups on file.    Rob Tran, , 4/15/2025, 11:16 AM        [1]   Current Outpatient Medications   Medication Sig Dispense Refill    diclofenac 75 MG Oral Tab EC Take 1 tablet (75 mg total) by mouth 2 (two) times daily. 60 tablet 1    colchicine 0.6 MG Oral Tab TAKE 2 TABLETS BY MOUTH NOW, THEN 1 TABLET IN 1 HOUR. FOLLOWING DAY START 1 TABLET BY MOUTH DAILY (Patient not taking: Reported on 4/15/2025) 30 tablet 2

## 2025-04-29 ENCOUNTER — TELEPHONE (OUTPATIENT)
Dept: PHYSICAL THERAPY | Facility: HOSPITAL | Age: 40
End: 2025-04-29

## 2025-04-30 ENCOUNTER — OFFICE VISIT (OUTPATIENT)
Dept: PHYSICAL THERAPY | Facility: HOSPITAL | Age: 40
End: 2025-04-30
Attending: FAMILY MEDICINE
Payer: COMMERCIAL

## 2025-04-30 DIAGNOSIS — M25.562 ACUTE PAIN OF LEFT KNEE: Primary | ICD-10-CM

## 2025-04-30 PROCEDURE — 97110 THERAPEUTIC EXERCISES: CPT

## 2025-04-30 PROCEDURE — 97140 MANUAL THERAPY 1/> REGIONS: CPT

## 2025-04-30 PROCEDURE — 97161 PT EVAL LOW COMPLEX 20 MIN: CPT

## 2025-04-30 NOTE — PROGRESS NOTES
LOWER EXTREMITY EVALUATION:     Diagnosis:   Acute pain of left knee (M25.562) Patient:  Emanuel Christina (40 year old, male)        Referring Provider: Rob Tran  Today's Date   4/30/2025    Precautions:  None   Date of Evaluation: 04/30/25  Next MD visit: NA  Date of Surgery: NA     PATIENT SUMMARY   Summary of chief complaints: L knee pain  History of current condition: The pt fell 3/17, it happened really fast so he's not sure how it happened or what happened. Went to the ED and was told there was some fluid build up in the back of the knee. States he saw the MD about 2 weeks ago and was told to hold off on walking on the TM until PT eval but the bike was ok. States he has been doing the bike and it has been feeling fine. Pain has been improving since the injury.   Pain level: current 4 /10, at best 4 /10, at worst 4 /10  Description of symptoms: Pain is achy and behind the knee. Pain is intermittent.   Occupation: Off work due to the knee, works as a  in a warehouse. Occasional pushing/pulling/lifting/carrying   Leisure activities/Hobbies: Biking (just started)   Prior level of function: No limitations with stairs  Current limitations: Stairs  Pt goals: For left leg behind the knee to feel better  Past medical history was reviewed with Emanuel.  Significant findings include: NA  Imaging/Tests: X-ray unremarkable   Emanuel  has no past medical history on file.  He  has a past surgical history that includes other surgical history (06/2022).    ASSESSMENT  Emanuel presents to physical therapy evaluation with primary c/o L knee pain. The results of the objective tests and measures show gait and posture abnormality, mild decrease in L knee ROM, and LLE weakness. Functional deficits include but are not limited to Stairs. Signs and symptoms are consistent with diagnosis of Acute pain of left knee (M25.562). Pt and PT discussed evaluation findings, pathology, POC and HEP.  Pt voiced understanding and  performs HEP correctly without reported pain. Skilled Physical Therapy is medically necessary to address the above impairments and reach functional goals.    OBJECTIVE:    Musculoskeletal  Observation: Wide DUARTE, swelling in L popliteal fossa  Palpation: TTP L popliteal fossa, quad set R excellent L good   Accessory Motion: patellar mobility B normal all planes, TF AP mobility B normal       ROM and Strength: (* denotes performed with pain)  Hip   MMT (-/5)    R L     Flex (L2) 5 5     Ext  4 3     Abd 5 5     ER 5 5     IR 5 5    ,   Knee   ROM MMT (-/5)    R L R L     Flex 128 125 5 5     Ext (L3) 0 -2 5 5*     ,   Ankle/Foot   MMT (-/5)    R L     PF 4+ 5     DF (L4) 5 5                Flexibility:  LE Flexibility R L     Hip Flexor min restricted mod restricted     Hamstrings WNL WNL     Quads min restricted min restricted     Balance and Functional Mobility:  Gait: pt ambulates on level ground with trendelenburg/waddle (increased L lateral lean during L stance)     SLS: R 30 sec,  L 30 sec     Today's Treatment and Response:   Pt education was provided on exam findings, treatment diagnosis, treatment plan, expectations, and prognosis.  Today's Treatment       4/30/2025   LE Treatment   Therapeutic Exercise SAQ with QS 3 sec hold x10  SLR with QS x10  Bridges x20   Manual Therapy Seated STM to L popliteus  Seated Gr III L TF AP and PA mobilization   Therapeutic Exercise Minutes 10   Manual Therapy Minutes 10   Evaluation Minutes 30   Total Time Of Timed Procedures 20   Total Time Of Service-Based Procedures 30   Total Treatment Time 50   HEP SAQ with QS  SLR with QS  Bridges        Patient was instructed in and issued a HEP for: SAQ with QS  SLR with QS  Bridges    Charges:  PT EVAL:  , Evalx1 MTx1 TEx1  In agreement with evaluation findings and clinical rationale, this evaluation involved LOW COMPLEXITY decision making due to no personal factors/comorbidities, 1-2 body structures involved/activity limitations,  and stable symptoms as documented in the evaluation.                                                                                                                PLAN OF CARE:    Goals: (to be met in 5 visits)    Not Met Progress Toward Partially Met Met   Pt will improve knee extension ROM to 0 deg to allow proper heel strike during gait and terminal knee extension in stance. [] [] [] []   Pt will improve knee AROM flexion to >130 degrees to improve ability to perform bending/squatting. [] [] [] []   Pt will improve quad strength to 5/5 to ascend 1 flight of stairs reciprocally without UE assist. [] [] [] []   Pt will increase hip and knee strength to grossly 5/5 to be able to get up and down from the floor safely. [] [] [] []   Pt will be independent and compliant with comprehensive HEP to maintain progress achieved in PT. [] [] [] []        Frequency / Duration: Patient will be seen 1-2x/wkx/week or a total of 5  visits over a 90 day period. Treatment will include: Gait training; Manual Therapy; Neuromuscular Re-education; Therapeutic Activities; Therapeutic Exercise; Home Exercise Program instruction    Education or treatment limitation: None   Rehab Potential: excellent     LEFS Score  LEFS Score: (Patient-Rptd) 71.25 % (4/30/2025 10:37 AM)      Patient/Family/Caregiver was advised of these findings, precautions, and treatment options and has agreed to actively participate in planning and for this course of care.    Thank you for your referral. Please co-sign or sign and return this letter via fax as soon as possible to [phone]. If you have any questions, please contact me at Dept: [phone]    Sincerely,  Electronically signed by therapist: Mary Shah, PT  Physician's certification required: Yes  I certify the need for these services furnished under this plan of treatment and while under my care.    X___________________________________________________ Date____________________    Certification  From: 4/30/2025  To:7/29/2025

## 2025-05-07 ENCOUNTER — APPOINTMENT (OUTPATIENT)
Dept: PHYSICAL THERAPY | Facility: HOSPITAL | Age: 40
End: 2025-05-07
Attending: FAMILY MEDICINE
Payer: COMMERCIAL

## 2025-05-07 ENCOUNTER — TELEPHONE (OUTPATIENT)
Dept: PHYSICAL THERAPY | Facility: HOSPITAL | Age: 40
End: 2025-05-07

## 2025-05-13 ENCOUNTER — TELEPHONE (OUTPATIENT)
Dept: PHYSICAL THERAPY | Facility: HOSPITAL | Age: 40
End: 2025-05-13

## 2025-05-14 ENCOUNTER — OFFICE VISIT (OUTPATIENT)
Dept: PHYSICAL THERAPY | Facility: HOSPITAL | Age: 40
End: 2025-05-14
Attending: FAMILY MEDICINE
Payer: COMMERCIAL

## 2025-05-14 PROCEDURE — 97140 MANUAL THERAPY 1/> REGIONS: CPT

## 2025-05-14 PROCEDURE — 97110 THERAPEUTIC EXERCISES: CPT

## 2025-05-14 NOTE — PROGRESS NOTES
Patient: Emanuel Christina (40 year old, male) Referring Provider:  Insurance:   Diagnosis: Acute pain of left knee (M25.562) Rob Chelsea  [facility]   Date of Surgery: NA Next MD visit:  N/A   Precautions:  None NA Referral Information:    Date of Evaluation: Req: 5, Auth: 5, Exp: 7/1/2025 04/30/25 POC Auth Visits:  5       Today's Date   5/14/2025    Subjective  The pt states no issues after the evaluation, no pain currently. Did his HEP a little bit, denies issues or questions.       Pain: 0/10     Objective       From IE:   Musculoskeletal  Observation: Wide DUARTE, swelling in L popliteal fossa  Palpation: TTP L popliteal fossa, quad set R excellent L good   Accessory Motion: patellar mobility B normal all planes, TF AP mobility B normal       ROM and Strength: (* denotes performed with pain)  Hip   MMT (-/5)    R L     Flex (L2) 5 5     Ext  4 3     Abd 5 5     ER 5 5     IR 5 5    ,   Knee   ROM MMT (-/5)    R L R L     Flex 128 125 5 5     Ext (L3) 0 -2 5 5*     ,   Ankle/Foot   MMT (-/5)    R L     PF 4+ 5     DF (L4) 5 5                Flexibility:  LE Flexibility R L     Hip Flexor min restricted mod restricted     Hamstrings WNL WNL     Quads min restricted min restricted     Balance and Functional Mobility:  Gait: pt ambulates on level ground with trendelenburg/waddle (increased L lateral lean during L stance)     SLS: R 30 sec,  L 30 sec     Assessment  The pt was able to progress with LLE loading with fair-good quad activation and without significant exacerbation of pain. He required some UE assist for unloading due to initial pain with step ups, pain was not worse following activity.    Goals (to be met in 5 visits)      Not Met Progress Toward Partially Met Met   Pt will improve knee extension ROM to 0 deg to allow proper heel strike during gait and terminal knee extension in stance. [] [] [] []   Pt will improve knee AROM flexion to >130 degrees to improve ability to perform bending/squatting. []  [] [] []   Pt will improve quad strength to 5/5 to ascend 1 flight of stairs reciprocally without UE assist. [] [] [] []   Pt will increase hip and knee strength to grossly 5/5 to be able to get up and down from the floor safely. [] [] [] []   Pt will be independent and compliant with comprehensive HEP to maintain progress achieved in PT. [] [] [] []        Plan  Cont per POC    Treatment Last 4 Visits  Treatment Day: 2       4/30/2025 5/14/2025   LE Treatment   Therapeutic Exercise SAQ with QS 3 sec hold x10  SLR with QS x10  Bridges x20 NuStep seat 15 6 min  SLR with QS 3x10  Bridges x30  Shuttle  lbs x30  Shuttle SL 75 lbs x30 L  Sidelying SL shuttle 30 lbs x30 L  Step ups onto 8\" step L lead x30 intermittent UE assist     Manual Therapy Seated STM to L popliteus  Seated Gr III L TF AP and PA mobilization PROM L knee ext  STM to L popliteus  Gr III L TF AP mobilization, Gr III L patellar mobilization all planes     Therapeutic Exercise Minutes 10 28   Manual Therapy Minutes 10 15   Evaluation Minutes 30    Total Time Of Timed Procedures 20 43   Total Time Of Service-Based Procedures 30 0   Total Treatment Time 50 43   HEP SAQ with QS  SLR with QS  Bridges         HEP  SAQ with QS  SLR with QS  Bridges    Charges     MTx1 TEx2

## 2025-05-15 ENCOUNTER — TELEMEDICINE (OUTPATIENT)
Dept: FAMILY MEDICINE CLINIC | Facility: CLINIC | Age: 40
End: 2025-05-15
Payer: COMMERCIAL

## 2025-05-15 DIAGNOSIS — M25.562 ACUTE PAIN OF LEFT KNEE: Primary | ICD-10-CM

## 2025-05-15 PROCEDURE — 98004 SYNCH AUDIO-VIDEO EST SF 10: CPT | Performed by: FAMILY MEDICINE

## 2025-05-15 NOTE — PROGRESS NOTES
This is a telemedicine visit with live, interactive video and audio.     Patient understands and accepts financial responsibility for any deductible, co-insurance and/or co-pays associated with this service.    SUBJECTIVE  Started PT for his knee pain. Off work at this time .  Has plan for 6 sessions of PT.  Has been doing better.  No new injury.      HISTORY:  Past Medical History[1]   Past Surgical History[2]   Family History[3]   Short Social Hx on File[4]     Allergies[5]   Current Medications[6]    OBJECTIVE  Physical Exam:   alert, appears stated age, and cooperative and able to sit and walk with minimal pain.  Walking up stairs creates more pain. .  No swelling.  No night time awakening.      ASSESSMENT & PLAN  Diagnoses and all orders for this visit:    Acute pain of left knee    Continue PT.  Has 4 sessions of PT at two per week.  He will remain off work until 100 percent recovered.  Will likely need the next two weeks.  Follow up in 2 weeks when PT complete.        Rob Tran DO             [1] No past medical history on file.  [2]   Past Surgical History:  Procedure Laterality Date    Other surgical history  06/2022    RT eye surgery    [3] No family history on file.  [4]   Social History  Socioeconomic History    Marital status: Single   Tobacco Use    Smoking status: Never    Smokeless tobacco: Never   Vaping Use    Vaping status: Never Used   Substance and Sexual Activity    Alcohol use: Yes     Comment: social    Drug use: Not Currently   [5] No Known Allergies  [6]   Current Outpatient Medications   Medication Sig Dispense Refill    diclofenac 75 MG Oral Tab EC Take 1 tablet (75 mg total) by mouth 2 (two) times daily. 60 tablet 1    colchicine 0.6 MG Oral Tab TAKE 2 TABLETS BY MOUTH NOW, THEN 1 TABLET IN 1 HOUR. FOLLOWING DAY START 1 TABLET BY MOUTH DAILY 30 tablet 2

## 2025-05-19 ENCOUNTER — OFFICE VISIT (OUTPATIENT)
Dept: PHYSICAL THERAPY | Facility: HOSPITAL | Age: 40
End: 2025-05-19
Attending: FAMILY MEDICINE
Payer: COMMERCIAL

## 2025-05-19 PROCEDURE — 97110 THERAPEUTIC EXERCISES: CPT

## 2025-05-19 PROCEDURE — 97112 NEUROMUSCULAR REEDUCATION: CPT

## 2025-05-19 PROCEDURE — 97140 MANUAL THERAPY 1/> REGIONS: CPT

## 2025-05-19 NOTE — PROGRESS NOTES
Patient: Emanuel Christina (40 year old, male) Referring Provider:  Insurance:   Diagnosis: Acute pain of left knee (M25.562) [facility] Chelsea  [facility]   Date of Surgery: NA Next MD visit:  N/A   Precautions:  None NA Referral Information:    Date of Evaluation: Req: 5, Auth: 5, Exp: 7/1/2025 04/30/25 POC Auth Visits:  5       Today's Date   5/19/2025    Subjective  The pt states he was a little sore the next day after the last therapy appointment but no pain.       Pain: 0/10     Objective        From IE:   Musculoskeletal  Observation: Wide DUARTE, swelling in L popliteal fossa  Palpation: TTP L popliteal fossa, quad set R excellent L good   Accessory Motion: patellar mobility B normal all planes, TF AP mobility B normal       ROM and Strength: (* denotes performed with pain)  Hip   MMT (-/5)    R L     Flex (L2) 5 5     Ext  4 3     Abd 5 5     ER 5 5     IR 5 5    ,   Knee   ROM MMT (-/5)    R L R L     Flex 128 125 5 5     Ext (L3) 0 -2 5 5*     ,   Ankle/Foot   MMT (-/5)    R L     PF 4+ 5     DF (L4) 5 5                Flexibility:  LE Flexibility R L     Hip Flexor min restricted mod restricted     Hamstrings WNL WNL     Quads min restricted min restricted     Balance and Functional Mobility:  Gait: pt ambulates on level ground with trendelenburg/waddle (increased L lateral lean during L stance)     SLS: R 30 sec,  L 30 sec     Assessment  Quad contraction and TKE continue to improve. Confidence with steps ups has increased but not yet back to PLOF. Pain with step ups was additionally less compared to the prior session.    Goals (to be met in 5 visits)      Not Met Progress Toward Partially Met Met   Pt will improve knee extension ROM to 0 deg to allow proper heel strike during gait and terminal knee extension in stance. [] [] [] []   Pt will improve knee AROM flexion to >130 degrees to improve ability to perform bending/squatting. [] [] [] []   Pt will improve quad strength to 5/5 to ascend 1 flight of  stairs reciprocally without UE assist. [] [] [] []   Pt will increase hip and knee strength to grossly 5/5 to be able to get up and down from the floor safely. [] [] [] []   Pt will be independent and compliant with comprehensive HEP to maintain progress achieved in PT. [] [] [] []        Plan  Cont per POC    Treatment Last 4 Visits  Treatment Day: 3       4/30/2025 5/14/2025 5/19/2025   LE Treatment   Therapeutic Exercise SAQ with QS 3 sec hold x10  SLR with QS x10  Bridges x20 NuStep seat 15 6 min  SLR with QS 3x10  Bridges x30  Shuttle  lbs x30  Shuttle SL 75 lbs x30 L  Sidelying SL shuttle 30 lbs x30 L  Step ups onto 8\" step L lead x30 intermittent UE assist   NuStep seat 15 6 min   Shuttle  lbs x30   Shuttle SL 75 lbs x30 L   Sidelying SL shuttle 30 lbs x30 L   Step ups onto 8\" step L lead x30 intermittent UE assist   Lat step ups 8\" step L lead x30  Slantboard stretch 2 min     Neuro Re-Education   STS LLE posterior x20  Standing TKE green tb x30 L  Fitterfirst board DL AP taps x30   Manual Therapy Seated STM to L popliteus  Seated Gr III L TF AP and PA mobilization PROM L knee ext  STM to L popliteus  Gr III L TF AP mobilization, Gr III L patellar mobilization all planes   PROM L knee ext   STM to L popliteus   Gr III L TF AP mobilization, Gr III L patellar mobilization all planes      Therapeutic Exercise Minutes 10 28 23   Neuro Re-Educ Minutes   8   Manual Therapy Minutes 10 15 12   Evaluation Minutes 30     Total Time Of Timed Procedures 20 43 43   Total Time Of Service-Based Procedures 30 0 0   Total Treatment Time 50 43 43   HEP SAQ with QS  SLR with QS  Bridges          HEP  SAQ with QS  SLR with QS  Bridges    Charges     MTx1 TEx2 NMRx1

## 2025-05-21 ENCOUNTER — OFFICE VISIT (OUTPATIENT)
Dept: PHYSICAL THERAPY | Facility: HOSPITAL | Age: 40
End: 2025-05-21
Attending: FAMILY MEDICINE
Payer: COMMERCIAL

## 2025-05-21 PROCEDURE — 97110 THERAPEUTIC EXERCISES: CPT

## 2025-05-21 PROCEDURE — 97140 MANUAL THERAPY 1/> REGIONS: CPT

## 2025-05-21 PROCEDURE — 97112 NEUROMUSCULAR REEDUCATION: CPT

## 2025-05-21 NOTE — PROGRESS NOTES
Patient: Emanuel Christina (40 year old, male) Referring Provider:  Insurance:   Diagnosis: Acute pain of left knee (M25.562) [facility]   Date of Surgery: NA Next MD visit:  N/A   Precautions:  None NA Referral Information:    Date of Evaluation: Req: 5, Auth: 5, Exp: 7/1/2025 04/30/25 POC Auth Visits:  5       Today's Date   5/21/2025    Subjective  The pt states no pain currently. Denies issues after the progressions last time.       Pain: 0/10     Objective          From IE:   Musculoskeletal  Observation: Wide DUARTE, swelling in L popliteal fossa  Palpation: TTP L popliteal fossa, quad set R excellent L good   Accessory Motion: patellar mobility B normal all planes, TF AP mobility B normal       ROM and Strength: (* denotes performed with pain)  Hip   MMT (-/5)    R L     Flex (L2) 5 5     Ext  4 3     Abd 5 5     ER 5 5     IR 5 5    ,   Knee   ROM MMT (-/5)    R L R L     Flex 128 125 5 5     Ext (L3) 0 -2 5 5*     ,   Ankle/Foot   MMT (-/5)    R L     PF 4+ 5     DF (L4) 5 5                Flexibility:  LE Flexibility R L     Hip Flexor min restricted mod restricted     Hamstrings WNL WNL     Quads min restricted min restricted     Balance and Functional Mobility:  Gait: pt ambulates on level ground with trendelenburg/waddle (increased L lateral lean during L stance)     SLS: R 30 sec,  L 30 sec     Assessment  Step ups are becoming less symptomatic and he did not require UE unloading for fwd step ups today, and minimal UE unloading for lateral step ups. He is making consistent progress towards all goals.    Goals (to be met in 5 visits)      Not Met Progress Toward Partially Met Met   Pt will improve knee extension ROM to 0 deg to allow proper heel strike during gait and terminal knee extension in stance. [] [] [] []   Pt will improve knee AROM flexion to >130 degrees to improve ability to perform bending/squatting. [] [] [] []   Pt will improve quad strength to 5/5 to ascend 1 flight of  stairs reciprocally without UE assist. [] [] [] []   Pt will increase hip and knee strength to grossly 5/5 to be able to get up and down from the floor safely. [] [] [] []   Pt will be independent and compliant with comprehensive HEP to maintain progress achieved in PT. [] [] [] []        Plan  Cont per POC    Treatment Last 4 Visits  Treatment Day: 4       4/30/2025 5/14/2025 5/19/2025 5/21/2025   LE Treatment   Therapeutic Exercise SAQ with QS 3 sec hold x10  SLR with QS x10  Bridges x20 NuStep seat 15 6 min  SLR with QS 3x10  Bridges x30  Shuttle  lbs x30  Shuttle SL 75 lbs x30 L  Sidelying SL shuttle 30 lbs x30 L  Step ups onto 8\" step L lead x30 intermittent UE assist   NuStep seat 15 6 min   Shuttle  lbs x30   Shuttle SL 75 lbs x30 L   Sidelying SL shuttle 30 lbs x30 L   Step ups onto 8\" step L lead x30 intermittent UE assist   Lat step ups 8\" step L lead x30  Slantboard stretch 2 min   NuStep seat 15 6 min   Shuttle SL 75 lbs x30 L   Sidelying SL shuttle 30 lbs x30 L   Step ups onto 8\" step L lead x30 no UE assist   Lat step ups 8\" step L lead x30 intermittent UE assist     Neuro Re-Education   STS LLE posterior x20  Standing TKE green tb x30 L  Fitterfirst board DL AP taps x30 STS LLE posterior x20  Standing TKE green tb x30 L  Fitterfirst board DL AP taps x30     Manual Therapy Seated STM to L popliteus  Seated Gr III L TF AP and PA mobilization PROM L knee ext  STM to L popliteus  Gr III L TF AP mobilization, Gr III L patellar mobilization all planes   PROM L knee ext   STM to L popliteus   Gr III L TF AP mobilization, Gr III L patellar mobilization all planes    PROM L knee ext   STM to L popliteus   Gr III L TF AP mobilization, Gr III L patellar mobilization all planes      Therapeutic Exercise Minutes 10 28 23 20   Neuro Re-Educ Minutes   8 8   Manual Therapy Minutes 10 15 12 10   Evaluation Minutes 30      Total Time Of Timed Procedures 20 43 43 38   Total Time Of Service-Based Procedures  30 0 0 0   Total Treatment Time 50 43 43 38   HEP SAQ with QS  SLR with QS  Bridges           HEP  SAQ with QS  SLR with QS  Bridges    Charges     MTx1 TEx1 NMRx1

## 2025-05-27 ENCOUNTER — TELEPHONE (OUTPATIENT)
Dept: FAMILY MEDICINE CLINIC | Facility: CLINIC | Age: 40
End: 2025-05-27

## 2025-05-27 ENCOUNTER — TELEPHONE (OUTPATIENT)
Dept: PHYSICAL THERAPY | Facility: HOSPITAL | Age: 40
End: 2025-05-27

## 2025-05-28 ENCOUNTER — TELEPHONE (OUTPATIENT)
Dept: PHYSICAL THERAPY | Facility: HOSPITAL | Age: 40
End: 2025-05-28

## 2025-05-29 NOTE — TELEPHONE ENCOUNTER
Disability and Protected health information received in Forms Dept, logged for processing.  Patient off pending 6/12/25 re-eval with provider.

## 2025-06-05 NOTE — TELEPHONE ENCOUNTER
Dr. Tran,    Please sign off on form if you agree to: Disability pending 6/12/25 evaluation due to left knee pain    -Signature page will be the first page scanned  -From your Inbasket, Highlight the patient and click Chart   -Double click the 5/28/25 Forms Completion telephone encounter  -Scroll down to the Media section   -Click the blue Hyperlink: Disability, Dr. Tran, 6/5/25  -Click Acknowledge located in the top right ribbon/menu   -Drag the mouse into the blank space of the document and a + sign will appear. Left click to   electronically sign the document.  -Once signed, simply exit out of the screen and you signature will be saved.     Thank you,  Lola HERNANDEZ

## 2025-06-10 ENCOUNTER — OFFICE VISIT (OUTPATIENT)
Dept: PHYSICAL THERAPY | Facility: HOSPITAL | Age: 40
End: 2025-06-10
Attending: FAMILY MEDICINE
Payer: COMMERCIAL

## 2025-06-10 PROCEDURE — 97140 MANUAL THERAPY 1/> REGIONS: CPT

## 2025-06-10 PROCEDURE — 97110 THERAPEUTIC EXERCISES: CPT

## 2025-06-10 NOTE — PROGRESS NOTES
Discharge Summary  Pt has attended 5 visits in Physical Therapy.     Patient: Emanuel Christina (40 year old, male) Referring Provider:  Insurance:   Diagnosis: Acute pain of left knee (M25.562) oRb Shiak  [facility]   Date of Surgery: NA Next MD visit:  N/A   Precautions:  None NA Referral Information:    Date of Evaluation: Req: 5, Auth: 5, Exp: 7/1/2025 04/30/25 POC Auth Visits:  5       Today's Date   6/10/2025    Subjective  The pt states he started having pain in the R knee last week without MELANY. States it feels like a heavy weight. Denies L knee pain, hasn't had pain it the L for a few weeks.       Pain: 8/10 (R knee)     Objective        R LE negative for edema, gastroc pain or tenderness  Hip       4/30/2025 6/10/2025   Hip ROM/MMT   Rt Hip Flexion MMT (L2) 5 3+*   Lt Hip Flexion MMT (L2) 5 5   Rt Hip Extension MMT 4 5   Lt Hip Extension MMT 3 5   Rt Hip Abduction MMT 5 5   Lt Hip Abduction MMT 5 5   Rt Hip ER MMT 5 5   Lt Hip ER MMT 5 5   Rt Hip IR MMT 5 5   Lt Hip IR MMT 5 5   , Knee       4/30/2025 6/10/2025   Knee ROM/MMT   Rt Knee Flexion 128 103*   Lt Knee Flexion 125 130   Rt Knee Flexion MMT 5 5   Lt Knee Flexion MMT 5 5   Rt Knee Extension (L3) 0 0   Lt Knee Extension (L3) -2 -2   Rt Knee Extension MMT (L3) 5 5   Lt Knee Extension MMT (L3) 5* 5   , Ankle/Foot       4/30/2025 6/10/2025   Ankle/Foot ROM/MMT   Rt Foot/Ank Pf MMT (S1) 4+    Lt Foot/Ank Pf MMT (S1) 5    Rt Foot/Ank Df MMT (L4) 5 5   Lt Foot/Ank Df MMT (L4) 5 5      Assessment  The pt presents with resolved L knee pain, improved L knee ROM to WFLs, and increased LLE strength to WFLs. He is no longer functionally limited due to L knee issues and is independent with his HEP. He has new onset of R knee pain and loss of R knee flex ROM. Encouraged him to follow up with MD regarding this. Will DC L knee POC at this time.    Goals (to be met in 5 visits)      Not Met Progress Toward Partially Met Met   Pt will improve knee extension ROM  to 0 deg to allow proper heel strike during gait and terminal knee extension in stance. [] [] [x] []   Pt will improve knee AROM flexion to >130 degrees to improve ability to perform bending/squatting. [] [] [] [x]   Pt will improve quad strength to 5/5 to ascend 1 flight of stairs reciprocally without UE assist. [] [] [] [x]   Pt will increase hip and knee strength to grossly 5/5 to be able to get up and down from the floor safely. [] [] [] [x]   Pt will be independent and compliant with comprehensive HEP to maintain progress achieved in PT. [] [] [] [x]        Plan  DC to HEP    Treatment Last 4 Visits  Treatment Day: 5       5/14/2025 5/19/2025 5/21/2025 6/10/2025   LE Treatment   Therapeutic Exercise NuStep seat 15 6 min  SLR with QS 3x10  Bridges x30  Shuttle  lbs x30  Shuttle SL 75 lbs x30 L  Sidelying SL shuttle 30 lbs x30 L  Step ups onto 8\" step L lead x30 intermittent UE assist   NuStep seat 15 6 min   Shuttle  lbs x30   Shuttle SL 75 lbs x30 L   Sidelying SL shuttle 30 lbs x30 L   Step ups onto 8\" step L lead x30 intermittent UE assist   Lat step ups 8\" step L lead x30  Slantboard stretch 2 min   NuStep seat 15 6 min   Shuttle SL 75 lbs x30 L   Sidelying SL shuttle 30 lbs x30 L   Step ups onto 8\" step L lead x30 no UE assist   Lat step ups 8\" step L lead x30 intermittent UE assist   Reassessment  NuStep 8 min  Step ups onto 8\" step L lead x30 no UE assist   Lat step ups 8\" step L lead x30 intermittent UE assist      Neuro Re-Education  STS LLE posterior x20  Standing TKE green tb x30 L  Fitterfirst board DL AP taps x30 STS LLE posterior x20  Standing TKE green tb x30 L  Fitterfirst board DL AP taps x30   --   Manual Therapy PROM L knee ext  STM to L popliteus  Gr III L TF AP mobilization, Gr III L patellar mobilization all planes   PROM L knee ext   STM to L popliteus   Gr III L TF AP mobilization, Gr III L patellar mobilization all planes    PROM L knee ext   STM to L popliteus   Gr III L TF  AP mobilization, Gr III L patellar mobilization all planes    PROM L knee ext   STM to L popliteus   Gr III L TF AP mobilization, Gr III L patellar mobilization all planes      Therapeutic Exercise Minutes 28 23 20 25   Neuro Re-Educ Minutes  8 8    Manual Therapy Minutes 15 12 10 10   Total Time Of Timed Procedures 43 43 38 35   Total Time Of Service-Based Procedures 0 0 0 0   Total Treatment Time 43 43 38 35        HEP  SAQ with QS  SLR with QS  Bridges    Charges     MTx1 TEx2

## 2025-06-12 ENCOUNTER — OFFICE VISIT (OUTPATIENT)
Dept: FAMILY MEDICINE CLINIC | Facility: CLINIC | Age: 40
End: 2025-06-12
Payer: COMMERCIAL

## 2025-06-12 VITALS
BODY MASS INDEX: 37.19 KG/M2 | WEIGHT: 315 LBS | HEIGHT: 77 IN | OXYGEN SATURATION: 98 % | HEART RATE: 89 BPM | SYSTOLIC BLOOD PRESSURE: 143 MMHG | TEMPERATURE: 98 F | DIASTOLIC BLOOD PRESSURE: 89 MMHG | RESPIRATION RATE: 18 BRPM

## 2025-06-12 DIAGNOSIS — M25.562 ACUTE PAIN OF LEFT KNEE: Primary | ICD-10-CM

## 2025-06-12 PROCEDURE — 3008F BODY MASS INDEX DOCD: CPT | Performed by: FAMILY MEDICINE

## 2025-06-12 PROCEDURE — 99213 OFFICE O/P EST LOW 20 MIN: CPT | Performed by: FAMILY MEDICINE

## 2025-06-12 PROCEDURE — 3079F DIAST BP 80-89 MM HG: CPT | Performed by: FAMILY MEDICINE

## 2025-06-12 PROCEDURE — 3077F SYST BP >= 140 MM HG: CPT | Performed by: FAMILY MEDICINE

## 2025-06-12 NOTE — PROGRESS NOTES
Subjective:   Emanuel Christina is a 40 year old male who presents for Follow - Up (Follow up on left knee- therapy completed-release to return to work )   Completed PT this week.     History/Other:    Chief Complaint Reviewed and Verified  Nursing Notes Reviewed and   Verified  Medications Reviewed         Tobacco:  He has never smoked tobacco.    Current Medications[1]      Review of Systems:  Review of Systems   Constitutional: Negative.    Musculoskeletal:  Positive for arthralgias.        Left knee pain mostly resolved. Some pain in right knee.          Objective:   /89   Pulse 89   Temp 97.9 °F (36.6 °C) (Temporal)   Resp 18   Ht 6' 5\" (1.956 m)   Wt (!) 478 lb (216.8 kg)   SpO2 98%   BMI 56.68 kg/m²  Estimated body mass index is 56.68 kg/m² as calculated from the following:    Height as of this encounter: 6' 5\" (1.956 m).    Weight as of this encounter: 478 lb (216.8 kg).  Physical Exam  Vitals reviewed.   Musculoskeletal:      Left knee: Normal.           Assessment & Plan:   1. Acute pain of left knee (Primary)  He has completed PT and pain has resolved. He should continue home exercises.  Follow up as needed. Cleared to return to work.       No follow-ups on file.    Rob Tran DO, 6/12/2025, 1:03 PM          [1]   Current Outpatient Medications   Medication Sig Dispense Refill    colchicine 0.6 MG Oral Tab TAKE 2 TABLETS BY MOUTH NOW, THEN 1 TABLET IN 1 HOUR. FOLLOWING DAY START 1 TABLET BY MOUTH DAILY 30 tablet 2    diclofenac 75 MG Oral Tab EC Take 1 tablet (75 mg total) by mouth 2 (two) times daily. (Patient not taking: Reported on 6/12/2025) 60 tablet 1

## 2025-07-07 ENCOUNTER — HOSPITAL ENCOUNTER (EMERGENCY)
Facility: HOSPITAL | Age: 40
Discharge: HOME | End: 2025-07-07
Attending: EMERGENCY MEDICINE
Payer: COMMERCIAL

## 2025-07-07 ENCOUNTER — APPOINTMENT (OUTPATIENT)
Dept: GENERAL RADIOLOGY | Facility: HOSPITAL | Age: 40
End: 2025-07-07
Payer: COMMERCIAL

## 2025-07-07 VITALS
OXYGEN SATURATION: 99 % | RESPIRATION RATE: 17 BRPM | WEIGHT: 315 LBS | TEMPERATURE: 98 F | HEART RATE: 81 BPM | BODY MASS INDEX: 48 KG/M2 | DIASTOLIC BLOOD PRESSURE: 92 MMHG | SYSTOLIC BLOOD PRESSURE: 142 MMHG

## 2025-07-07 DIAGNOSIS — M25.561 ACUTE PAIN OF RIGHT KNEE: Primary | ICD-10-CM

## 2025-07-07 PROCEDURE — 96372 THER/PROPH/DIAG INJ SC/IM: CPT

## 2025-07-07 PROCEDURE — 99283 EMERGENCY DEPT VISIT LOW MDM: CPT

## 2025-07-07 PROCEDURE — 73560 X-RAY EXAM OF KNEE 1 OR 2: CPT | Performed by: RADIOLOGY

## 2025-07-07 PROCEDURE — 99284 EMERGENCY DEPT VISIT MOD MDM: CPT

## 2025-07-07 RX ORDER — KETOROLAC TROMETHAMINE 30 MG/ML
30 INJECTION, SOLUTION INTRAMUSCULAR; INTRAVENOUS ONCE
Status: COMPLETED | OUTPATIENT
Start: 2025-07-07 | End: 2025-07-07

## 2025-07-07 NOTE — ED PROVIDER NOTES
Patient Seen in: [facility] Emergency Department    History     Chief Complaint   Patient presents with    Knee Pain       HPI    40-year-old male here with right knee pain for the last 3 weeks, denies any pops twist or falls however reports that he fell onto his left knee and was preferring his right knee for the last 3 weeks putting more weight on the right knee and thus attributes his right knee pain to this though ports that the left knee pain is much better now.    History reviewed. Past Medical History[1]    History reviewed. Past Surgical History[2]      Medications :  Prescriptions Prior to Admission[3]     Family History[4]    Smoking Status: Social Hx on file[5]    Constitutional and vital signs reviewed.      Social History and Family History elements reviewed from today, pertinent positives to the presenting problem noted.    Physical Exam     ED Triage Vitals   BP 07/07/25 1023 (!) 171/106   Pulse 07/07/25 1023 74   Resp 07/07/25 1023 18   Temp 07/07/25 1023 98 °F (36.7 °C)   Temp src --    SpO2 07/07/25 1023 98 %   O2 Device 07/07/25 1218 None (Room air)       All measures to prevent infection transmission during my interaction with the patient were taken. The patient was already wearing a droplet mask on my arrival to the room. Personal protective equipment was worn throughout the duration of the exam.  Handwashing was performed prior to and after the exam.  Stethoscope and any equipment used during my examination was cleaned with super sani-cloth germicidal wipes following the exam.     Physical Exam    General: NAD  Head: Normocephalic and atraumatic.  Mouth/Throat/Ears/Nose: No hoarseness of voice  Eyes: Conjunctivae and EOM are normal.  Neck: Normal range of motion. Supple.   Cardiovascular: Normal rate  Respiratory/Chest: No tachypnea.  Gastrointestinal: Nondistended  Musculoskeletal:No swelling or deformity.  5 out of 5 right plantarflexion and knee extension strength.  Sensation intact  to light touch.  Negative varus and valgus laxity.  Normal gait.  Nonfocal tenderness  Neurological: Alert and appropriate.   Skin: Skin is warm and dry. No pallor.  Psychiatric: Has a normal mood and affect.      ED Course      Labs Reviewed - No data to display    As Interpreted by me    Imaging Results Available and Reviewed while in ED: XR KNEE (1 OR 2 VIEWS), RIGHT (CPT=73560)  Result Date: 7/7/2025  CONCLUSION: No acute or high-grade osseous abnormality identified. Electronically Verified and Signed by Attending Radiologist: Abhishek Thompson MD 7/7/2025 11:51 AM Workstation: [employer]    ED Medications Administered:   Medications   ketorolac (Toradol) 30 MG/ML injection 30 mg (30 mg Intramuscular Given 7/7/25 1306)         MDM     Vitals:    07/07/25 1023 07/07/25 1218   BP: (!) 171/106 (!) 142/92   Pulse: 74 81   Resp: 18 17   Temp: 98 °F (36.7 °C)    SpO2: 98% 99%   Weight: (!) 183.7 kg      *I personally reviewed and interpreted all ED vitals.    Pulse Ox: 99%, Room air, Normal       Medical Decision Making      Differential Diagnosis/ Diagnostic Considerations: Knee arthritis, soft tissue injury, fracture    Complicating Factors: The patient already has morbid obesity to contribute to the complexity of this ED evaluation.    I reviewed prior chart records including office note from June 12, 2025.  X-ray negative for fracture on my interpretation.  Normal gait, orthopedics referral provided.  IM Toradol provided.    Dc In stable condition.  Patient is comfortable with the plan.    Prescriptions: Discussed over-the-counter NSAIDs      Disposition and Plan     Clinical Impression:  1. Acute pain of right knee        Disposition:  Discharge    Follow-up:  Rob Tran DO  [address]  [facility] [id number] [phone]    Schedule an appointment as soon as possible for a visit in 1 day(s)      Mj Martins MD  [address]  [facility] [address] [phone]    Schedule an appointment as soon  as possible for a visit in 1 day(s)        Medications Prescribed:  Discharge Medication List as of 7/7/2025 12:59 PM                           [1] History reviewed. No pertinent past medical history.  [2]   Past Surgical History:  Procedure Laterality Date    Other surgical history  06/2022    RT eye surgery    [3] (Not in a hospital admission)   [4] No family history on file.  [5]   Social History  Socioeconomic History    Marital status: Single   Tobacco Use    Smoking status: Never    Smokeless tobacco: Never   Vaping Use    Vaping status: Never Used   Substance and Sexual Activity    Alcohol use: Yes     Comment: social    Drug use: Not Currently

## 2025-07-24 ENCOUNTER — PATIENT MESSAGE (OUTPATIENT)
Dept: FAMILY MEDICINE CLINIC | Facility: CLINIC | Age: 40
End: 2025-07-24

## (undated) DIAGNOSIS — M79.674 PAIN OF TOE OF RIGHT FOOT: ICD-10-CM

## (undated) DIAGNOSIS — M79.672 LEFT FOOT PAIN: Primary | ICD-10-CM

## (undated) NOTE — LETTER
3/24/2022              Dave Delatorre        [address] [place] [address]         To Whom it may concern: This is to certify that Dave Delatorre had an appointment on 3/24/2022 at 2:11 PM with Haily Hooper DO. Off work 3/19 through 3/27. If pain resolves may return to work without restrictions Monday, March 28 without restrictions.        Sincerely,      Haily Hooper DO  [place], [place]  [address] [place] [id number] [phone]        Document electronically generated by:  Haily Hooper DO

## (undated) NOTE — LETTER
Date & Time: 11/22/2022, 12:41 AM  Patient: Shira Pedro  Encounter Provider(s):    Rosalio Stallworth MD       To Whom It May Concern:    Shira Pedro was seen and treated in our department on 11/21/2022. He can return to work 11/23/2022.     If you have any questions or concerns, please do not hesitate to call.        _____________________________  Physician/APC Signature

## (undated) NOTE — LETTER
4/29/2022              Clovis Pineda        [address] [place] [address]         To Whom it may concern: This is to certify that Clovis Pineda had an appointment on 4/29/2022 at 1:22 PM with Freedom [place] DO. Due to current medical condition he needs to be limited to an eight hour work day. Please begin the restriction 5/2/22. He was seen in a clinic on 4/26/22 for a medical procedure he needs to be excused.         Sincerely,    Freedom [place] DO  [address] [place] [address]  [address] [place] [phone]  [phone]        Document electronically generated by:  Freedom [place] DO

## (undated) NOTE — LETTER
2/22/2022              Susan Casper        [address] [place] [address]         To Whom it may concern: This is to certify that Susan Casper had an appointment on 2/22/2022 at 10:52 AM with Divya Rubio DO. Off work Friday 2/18 and may return to work without restriction 2/23/2022.       Sincerely,    Divya Rubio DO  [place], [place]  [address] [place] [phone]  [phone]        Document electronically generated by:  Divya Rubio DO

## (undated) NOTE — LETTER
5/15/2025              Emanuel Christina        [address] [place] [address]         To Whom it may concern:    This is to certify that Emanuel Christina had an appointment on 5/15/2025 at 2:10 PM with Rob Tran DO.  He should remain off work until therapy completed.  He will be seen for return to work status at that time.       Sincerely,     Rob Tran DO  [facility], [address] [address] [phone]        Document electronically generated by:  Rob Tran DO

## (undated) NOTE — LETTER
8/8/2022              Unknown Colder        [address] [facility] [address]         To Whom it may concern: This is to certify that Unknown Colder had an appointment on 8/8/2022 at 5:01 PM with Janeice Olszewski, DO. Off work 8/6 and 8/7 due to gout flare. He may return to work 8/10/2022 without restriction.        Sincerely,      Janeice Olszewski, DO  [address] [facility]  [address] [place] [phone] [phone]        Document electronically generated by:  Janeice Olszewski, DO

## (undated) NOTE — ED AVS SNAPSHOT
Parent/Legal Guardian Access to the Online ideacts innovations Record of a Patient 15to 16Years Old  Return completed form by Secure email to [facility]/Medical Records Department: [email or web address].     Requirements and Procedures   Under [facility] MyChart ID and password with another person, that person may be able to view my or my child’s health information, and health information about someone who has authorized me as a MyChart proxy.    ·  I agree that it is my responsibility to select a confident Sign-Up Form and I agree to its terms.        Authorization Form     Please enter Patient’s information below:   Name (last, first, middle initial) __________________________________________   Gender  Male  Female    Last 4 Digits of Social Security Number Parent/Legal Guardian Signature                                  For Patient (1517 years of age)  I agree to allow my parent/legal guardian, named above, online access to my medical information currently available and that may become available as a result

## (undated) NOTE — LETTER
9/1/2022              Scarlet Ramirez        [address] [place] [address]         To Whom it may concern: This is to certify that Scarlet Ramirez had an appointment on 9/1/2022 at 2:22 PM with Leonel Juarez DO. Off work 8/20/2022 through 9/1 due to medical condition . May return to work 9/2/2022.       Sincerely,      Leonel Juarez DO  [address] [place], [place]  [address] [place] [phone]  [phone]        Document electronically generated by:  Leonel Juarez DO

## (undated) NOTE — LETTER
8/30/2021              Aidan Mason        [address] [place] [address]         To Whom it may concern: This is to certify that Aidan Mason had an appointment on 8/30/2021 at 12:53 PM with DO. Kaushik Salgado

## (undated) NOTE — LETTER
10/9/2021              Jeff Sutton        [address] [place] [address]         To Whom it may concern: This is to certify that Jeff Sutton had an appointment on 10/9/2021 at 12:04 PM with Renetta Marc DO.   Patient had

## (undated) NOTE — LETTER
6/12/2025              Emanuel Christina        [address] [place] [address]         To Whom it may concern:    This is to certify that Emanuel Christina had an appointment on 6/12/2025 at 1:06 PM with Rob Tran DO.  May return to work without restrictions beginning 6/16/2025.      Sincerely,       Rob Tran DO  [address] [address] [phone]        Document electronically generated by:  Rob Tran DO

## (undated) NOTE — LETTER
12/26/2022          To Whom It May Concern:    Norberto Najjar is currently under my medical care and may not return to work at this time. Please excuse Bel Grullon for 2 days 10/15/22 and 10/16. He may return to work on 10/17/22. Leane Alejandra Activity is restricted as follows: none. If you require additional information please contact our office.         Sincerely,      Chelsea Garrett DO          Document generated by:  Chelsea Garrett DO

## (undated) NOTE — LETTER
Date & Time: 8/5/2024, 10:28 PM  Patient: Emanuel Christina  Encounter Provider(s):    Faustino Dhaliwal MD       To Whom It May Concern:    Emanuel Christina was seen and treated in our department on 8/5/2024. He can return to work tomorrow.    If you have any questions or concerns, please do not hesitate to call.        _____________________________  Physician/APC Signature

## (undated) NOTE — LETTER
4/15/2025              Emanuel Christina        [address], [address] [place] [address]         To Whom it may concern:    This is to certify that Emanuel Christina had an appointment on 4/15/2025 at 11:19 AM with Rob Tran DO.  Remain off work at this time.  Awaiting physical therapy to start 4/30/2025.  Follow up one week approximately after therapy starts.        Sincerely,     Rob Tran DO  [address] [address]  [phone]        Document electronically generated by:  Rob Tran DO

## (undated) NOTE — ED AVS SNAPSHOT
Lexy Camera   MRN: [id number]    Department:  [facility] Emergency Department   Date of Visit:  8/27/2017           Disclosure     Insurance plans vary and the physician(s) referred by the ER may not be covered by your plan.  Please contact yo CARE PHYSICIAN AT ONCE OR RETURN IMMEDIATELY TO THE EMERGENCY DEPARTMENT. If you have been prescribed any medication(s), please fill your prescription right away and begin taking the medication(s) as directed.   If you believe that any of the medications

## (undated) NOTE — LETTER
Date & Time: 3/18/2025, 1:52 PM  Patient: Emanuel Christina  Encounter Provider(s):    Raj Villalta MD       To Whom It May Concern:    Emanuel Christina was seen and treated in our department on 3/18/2025. He should not return to work until 03/24/2025 .    If you have any questions or concerns, please do not hesitate to call.        _____________________________  Physician/APC Signature

## (undated) NOTE — LETTER
7/9/2024              Emanuel Sanford        [address] [place] [address]         To Whom it may concern:    This is to certify that Emanuel Sanford had an appointment on 7/9/2024 at 9:45 AM with Rob Tran DO.  He required 7/8 and 7/9 off in addition and will be returning 7/10/2024.      Sincerely,       Rob Tran DO  [facility], [address] [address] [phone]        Document electronically generated by:  Rob Tran DO

## (undated) NOTE — LETTER
State of [facility] 57 Examination       Student's Name  Thena Sober Birth Haider initials by date(s) and sign here.)   ALTERNATIVE PROOF OF IMMUNITY   1.Clinical diagnosis (measles, mumps, hepatitis B) is allowed when verified by physician & supported with lab confirmation. Attach copy of lab result.        *MEASLES ([place])  MO/DA/YR Developmental delay? Yes   No    Yes   No  Hospitalizations? When? What for? Yes   No    Blood disorders? Hemophilia, Sickle Cell, Other? Explain. Yes   No  Surgery? (List all.)  When? What for? Yes   No    Diabetes?    Yes   No  Serious injur thru 6 yrs enrolled in licensed or public school operated day care, ,  nursery school and/or  (blood test req’d if resides in [place] or high risk [facility])   Questionnaire Administered:{YES:829::\"Yes\"}   Blood Test Indicated:{NO:830::\"No\" (e.g. Short Acting Beta Antagonist): {NO:830::\"No\"}          Controller medication (e.g. inhaled corticosteroid):   {NO:830::\"No\"} Other   NEEDS/MODIFICATIONS required in the school setting  {DMG_NONE:1367::\"None\"} DIETARY Needs/Restrictions     {DMG

## (undated) NOTE — LETTER
10/9/2021              Yassine Waterman        [address] [place] [address]         To Whom it may concern: This is to certify that Olimpiaprecious Gilamber was seen in the Emergency Dept. At [facility]  On 6/12/2021.   He was release

## (undated) NOTE — LETTER
Date & Time: 6/12/2021, 8:47 PM  Patient: Fang Wiseman  Encounter Provider(s):    On File, DOMINGO DO Attending  SARTHAK Crowder       To Whom It May Concern:    Fang Wiseman was seen and treated in our department on 6/12/2021. He can return to work.

## (undated) NOTE — LETTER
Date & Time: 2/6/2021, 1:51 PM  Patient: Early Aye  Encounter Provider(s): Sandrita Adams MD       To Whom It May Concern:    Early Aye was seen and treated in our department on 2/6/2021. He can return to work without limitations.     If you ha

## (undated) NOTE — LETTER
March 15, 2022         Cristina Hobbs DO  [address] [facility]      Patient: Ruthy Parker   YOB: 1985   Date of Visit: 3/11/2022       Dear Dr. Kai Gosselin,    I saw your patient, Ruthy Parker, on 3/11/2022. Enclosed is my consultation / progress note from that encounter. Thank you for allowing me to participate in the care of this patient.     Sincerely,                           Oxana Munoz DPM  [address], [address] [facility] [address]    Document electronically generated by:  Oxana Munoz DPM on 3/15/2022    CC: No Recipients    Enclosure

## (undated) NOTE — LETTER
10/1/2021              Dajuan Tam        [address] [place] [address]         To Whom it may concern: This is to certify that Dajuan Tam had an appointment on 10/1/2021 at 1:59 PM with Eva Schultz DO.   Off work 9/28

## (undated) NOTE — LETTER
Date & Time: 7/11/2021, 6:42 PM  Patient: Byron Raymundo  Encounter Provider(s):    Vee Giron MD       To Whom It May Concern:    Byron Raymundo was seen and treated in our department on 7/11/2021. He can return to work.     If you have any questions o

## (undated) NOTE — LETTER
8/13/2021              Kareem Foy        [address] [place] [address]         To Whom it may concern: This is to certify that Kareem Foy had an appointment on 8/13/2021 at 9:57 AM with Anne Marie Houser DO.   Off work 8/11

## (undated) NOTE — LETTER
4/8/2022      To Whom It May Concern:    Naima Adams is currently under my medical care and may not return to work at this time. Please excuse Nino Aly for 10 days. He may return to work on 4/11/2022. Activity is restricted as follows: none. If you require additional information please contact our office.       Sincerely,    DO [address] [place] [address]   [phone]

## (undated) NOTE — LETTER
3/7/2022              Clovis Pineda        [address] [place] [address]         To Whom it may concern: This is to certify that Clovis Pineda had an appointment on 3/7/2022 at 1:44 PM with Freedom Freeman DO. Off work 3/6 for medical issue. May return to work 3/9 without restrictions.      Sincerely,      Freedom Freeman DO  [address] [address] [phone]        Document electronically generated by:  Freedom Freeman DO

## (undated) NOTE — LETTER
Date: 8/26/2024    Patient Name: Emanuel Christina          To Whom it may concern:    The above patient was seen at [facility] for treatment of a medical condition via telemedicine    This patient should be excused from attending work 8/26/2024.      Sincerely,    TODD Hutton

## (undated) NOTE — LETTER
January 26, 2018    Patient: Neno Kwan   Date of Visit: 1/26/2018       To Whom It May Concern:    Neno Kwan was seen and treated in our emergency department on 1/26/2018. He is allowed to return to work on 1/29/18.     If you have any questions

## (undated) NOTE — LETTER
[facility], VIRTUAL VISIT  [address] [facility] [address] [facility] [address]  [phone]          09/03/24    Emanuel Christina  1/25/1985        This document is to verify Emanuel Christina was seen for evaluation and treatment.    Please excuse the patient from work for the follow date(s):  09/04/24 - 09/05/24.    The patient can return on 9/6/24 without restrictions.    Please call the number listed above if you have further questions.        Thank you,        Jalyn Graham PA-C

## (undated) NOTE — LETTER
6/16/2022              Michael Cook        [address] [place], [place] [id number]         To Whom it may concern: This is to certify that Michael Cook had an appointment on 6/16/2022 at 2:21 PM with Warren Piedra DO. Due to current medical condition he needs to be limited to an eight hour work day. Please begin the restriction 5/2/22 and plan to continue this restriction for the next thirty days.        Sincerely,      DO Renetta Kay [facility]  [address] [place] [id number]  [phone]        Document electronically generated by:  Warren Piedra DO

## (undated) NOTE — LETTER
Date: 7/2/2024    Patient Name: Emanuel Christina          To Whom it may concern:    This letter has been written at the patient's request. The above patient was seen at [facility] for treatment of a medical condition.    This patient should be excused from attending work from 7/1/24 through 7/2/24.    The patient may return to work on 7/3/24        Sincerely,        SHIVANI Castle Jr.

## (undated) NOTE — ED AVS SNAPSHOT
Anaya Taylor   MRN: [id number]    Department:  [facility] Emergency Department   Date of Visit:  8/31/2017           Disclosure     Insurance plans vary and the physician(s) referred by the ER may not be covered by your plan.  Please contact yo CARE PHYSICIAN AT ONCE OR RETURN IMMEDIATELY TO THE EMERGENCY DEPARTMENT. If you have been prescribed any medication(s), please fill your prescription right away and begin taking the medication(s) as directed.   If you believe that any of the medications

## (undated) NOTE — ED AVS SNAPSHOT
Shaunna Phelps   MRN: [id number]    Department:  [facility] Emergency Department   Date of Visit:  1/26/2018           Disclosure     Insurance plans vary and the physician(s) referred by the ER may not be covered by your plan.  Please contact yo CARE PHYSICIAN AT ONCE OR RETURN IMMEDIATELY TO THE EMERGENCY DEPARTMENT. If you have been prescribed any medication(s), please fill your prescription right away and begin taking the medication(s) as directed.   If you believe that any of the medications

## (undated) NOTE — LETTER
Date & Time: 7/7/2025, 1:03 PM  Patient: Emanuel Christina  Encounter Provider(s):    Yury Stark MD       To Whom It May Concern:    Emanuel Christina was seen and treated in our department on 7/7/2025. He can return to work.    If you have any questions or concerns, please do not hesitate to call.        _____________________________  Physician/APC Signature

## (undated) NOTE — LETTER
4/1/2025              Emanuel Christina        [address], [address] [place] [id number]         To Whom it may concern:    This is to certify that Emanuel Christina had an appointment on 4/1/2025 at 10:05 AM with Rob Tran DO.  He will begin physical therapy and have a return visit here for reevaluation in 2 weeks.  Work status to be addressed at that time.  Remain off work at this time.        Sincerely,     Rob Tran DO  [address] [id number] [phone]        Document electronically generated by:  Rob Tran DO